# Patient Record
Sex: FEMALE | Race: BLACK OR AFRICAN AMERICAN | Employment: FULL TIME | URBAN - METROPOLITAN AREA
[De-identification: names, ages, dates, MRNs, and addresses within clinical notes are randomized per-mention and may not be internally consistent; named-entity substitution may affect disease eponyms.]

---

## 2017-09-01 ENCOUNTER — OFFICE VISIT (OUTPATIENT)
Dept: FAMILY MEDICINE CLINIC | Age: 52
End: 2017-09-01

## 2017-09-01 VITALS
HEIGHT: 61 IN | TEMPERATURE: 98.9 F | BODY MASS INDEX: 23.79 KG/M2 | OXYGEN SATURATION: 98 % | SYSTOLIC BLOOD PRESSURE: 122 MMHG | RESPIRATION RATE: 18 BRPM | DIASTOLIC BLOOD PRESSURE: 80 MMHG | WEIGHT: 126 LBS | HEART RATE: 80 BPM

## 2017-09-01 DIAGNOSIS — R23.3 EASY BRUISING: ICD-10-CM

## 2017-09-01 DIAGNOSIS — L65.9 ALOPECIA: ICD-10-CM

## 2017-09-01 DIAGNOSIS — R25.2 CRAMP OF BOTH LOWER EXTREMITIES: ICD-10-CM

## 2017-09-01 DIAGNOSIS — Z00.00 ROUTINE GENERAL MEDICAL EXAMINATION AT A HEALTH CARE FACILITY: Primary | ICD-10-CM

## 2017-09-01 NOTE — PROGRESS NOTES
Jesús Corral is a 50 y.o. female here to establish care       1. Have you been to the ER, urgent care clinic or hospitalized since your last visit? NO.     2. Have you seen or consulted any other health care providers outside of the [facility] since your last visit (Include any pap smears or colon screening)? NO      Do you have an Advanced Directive? NO    Would you like information on Advanced Directives?  NO    Learning Assessment 9/1/2017   PRIMARY LEARNER Patient   HIGHEST LEVEL OF EDUCATION - PRIMARY LEARNER  4 YEARS OF COLLEGE   BARRIERS PRIMARY LEARNER NONE   CO-LEARNER CAREGIVER No   PRIMARY LANGUAGE ENGLISH   LEARNER PREFERENCE PRIMARY DEMONSTRATION     VIDEOS   ANSWERED BY patient    RELATIONSHIP SELF

## 2017-09-01 NOTE — PROGRESS NOTES
HISTORY OF PRESENT ILLNESS  Latisha Pickens is a 50 y.o. female. Establish Care   The history is provided by the patient. Pertinent negatives include no chest pain, no headaches and no shortness of breath. History reviewed. No pertinent past medical history. Past Surgical History:   Procedure Laterality Date    HX  SECTION      HX HYSTEROSCOPY WITH ENDOMETRIAL ABLATION         Family History   Problem Relation Age of Onset    Hypertension Mother     Diabetes Mother    [facility] Hearing Impairment Mother     Kidney Disease Mother     Cancer Father      bladder cancer     Diabetes Father     Diabetes Maternal Grandmother     Hypertension Maternal Grandmother     Heart Attack Maternal Grandmother     Cancer Maternal Grandfather      unknown    Heart Disease Neg Hx        History   Smoking Status    Never Smoker   Smokeless Tobacco    Never Used       History   Alcohol Use    Yes     Comment: social drinker        Health Maintenance Review:  Colonoscopy - Due referral done by GYN  Mammogram -  - additional views requested and scheduled  Pap Smear - 2017, normal uterine fibroids on ultrasound    Immunizations:  Tetanus - 3/2017  Influenza - Declines    Establish Care/Wellness Encounter Plan:  Anticipatory guidance and recommendations provided verbally and with printed information. Return for routine follow up in 1 year, sooner with any problems. Review of Systems   Constitutional: Negative for chills, fever and weight loss. HENT: Negative for hearing loss. Eyes: Negative for blurred vision and double vision. \" preglaucoma\" on drops   Respiratory: Negative for cough, shortness of breath and wheezing. Cardiovascular: Negative for chest pain, palpitations and leg swelling. Genitourinary: Negative for dysuria and urgency. Musculoskeletal: Negative for joint pain. Myalgias:     Skin: Negative for itching and rash.    Neurological: Negative for dizziness, tingling, sensory change, focal weakness and headaches. Endo/Heme/Allergies: Positive for environmental allergies (seasonal). Psychiatric/Behavioral: Negative for depression. The patient is not nervous/anxious and does not have insomnia. Visit Vitals    /80 (BP 1 Location: Left arm, BP Patient Position: Sitting)    Pulse 80    Temp 98.9 °F (37.2 °C) (Oral)    Resp 18    Ht 5' 1\" (1.549 m)    Wt 126 lb (57.2 kg)    LMP  (LMP Unknown)    SpO2 98%    BMI 23.81 kg/m2       Physical Exam   Constitutional: She is oriented to person, place, and time. She appears well-developed and well-nourished. HENT:   Right Ear: Tympanic membrane and ear canal normal.   Left Ear: Tympanic membrane and ear canal normal.   Mouth/Throat: Oropharynx is clear and moist.   Eyes: Conjunctivae and EOM are normal. Pupils are equal, round, and reactive to light. Neck: Neck supple. Cardiovascular: Normal rate, regular rhythm and normal heart sounds. Pulmonary/Chest: Effort normal and breath sounds normal.   Abdominal: Soft. Bowel sounds are normal. She exhibits no mass. There is no tenderness. Neurological: She is alert and oriented to person, place, and time. Rhomberg negative   Psychiatric: She has a normal mood and affect. Her behavior is normal.   Nursing note and vitals reviewed. Problems Encounter:  HPI - Reports persistent problems with cramps in her calves during the day and night, cramps are severe and \"make her toes curl\", chronic constipation, had been doing well with Miralax but stopped it out of concern that it might exacerbate leg cramps, reports concern about thinning hair, no large pateches     ROS:  Gastrointestinal: Positive for constipation (Taking Mirilax with good results, concerned about impact on leg cramps ). Negative for abdominal pain, diarrhea, heartburn, nausea and vomiting. Musculoskeletal: Positive for myalgias (cramping in both calfs off and on for  two years, makes toes curl).  Negative for joint pain. Skin: Negative for itching and rash. Hair is starting to thin   Exam:  Head: Normocephalic. Unable to examine scalp/hair follicles because of patient wearing hair extensions. Abdominal: Soft. Bowel sounds are normal. She exhibits no mass. There is no tenderness. Musculoskeletal: No calf asymmetry or tenderness She exhibits no edema. Neurological: She displays abnormal reflex (absent patellar and Achilles). Cardiovascular: Intact distal pulses. Neurological: She displays abnormal reflex (absent patellar and Achilles). Skin: Skin is warm and dry with no ecchymosis noted currently     ASSESSMENT and PLAN    ICD-10-CM ICD-9-CM    1. Routine general medical examination at a health care facility Z00.00 V70.0    2. Alopecia L65.9 704.00    3. Easy bruising R23.8 782.9    4. Cramp of both lower extremities R25.2 729.82    Sign release for medical records  Further disposition regarding lab evaluation of leg cramps, easy bruising and alopecia after recent previous lab results are reviewed to determine what studies have already been done.

## 2017-09-01 NOTE — MR AVS SNAPSHOT
Visit Information Date & Time Provider Department Dept. Phone Encounter #  
 9/1/2017  9:00 AM Kesha Luu, [facility] [phone] [id number] Upcoming Health Maintenance Date Due DTaP/Tdap/Td series (1 - Tdap) 3/6/1988 PAP AKA CERVICAL CYTOLOGY 3/6/1988 BREAST CANCER SCRN MAMMOGRAM 3/6/2017 FOBT Q 1 YEAR AGE 50-75 3/6/2017 Allergies as of 9/1/2017  Review Complete On: 9/1/2017 By: Kesha Luu MD  
  
 Severity Noted Reaction Type Reactions Aspirin  09/01/2017    Other (comments) Pt reports bruising Current Immunizations  Never Reviewed No immunizations on file. Not reviewed this visit You Were Diagnosed With   
  
 Codes Comments Routine general medical examination at a health care facility    -  Primary ICD-10-CM: Z00.00 ICD-9-CM: V70.0 Alopecia     ICD-10-CM: L65.9 ICD-9-CM: 704.00 Easy bruising     ICD-10-CM: R23.8 ICD-9-CM: 782.9 Cramp of both lower extremities     ICD-10-CM: R25.2 ICD-9-CM: 729.82 Vitals BP Pulse Temp Resp Height(growth percentile) Weight(growth percentile) 122/80 (BP 1 Location: Left arm, BP Patient Position: Sitting) 80 98.9 °F (37.2 °C) (Oral) 18 5' 1\" (1.549 m) 126 lb (57.2 kg) LMP SpO2 BMI OB Status Smoking Status (LMP Unknown) 98% 23.81 kg/m2 Having regular periods Never Smoker BMI and BSA Data Body Mass Index Body Surface Area  
 23.81 kg/m 2 1.57 m 2 Your Updated Medication List  
  
Notice  As of 9/1/2017  9:56 AM  
 You have not been prescribed any medications. Patient Instructions Sign release for medical records Anticipatory guidance and recommendations provided verbally and with printed information. Return for routine follow up in 1 year, sooner with any problems. Well Visit, Women 48 to 72: Care Instructions Your Care Instructions Physical exams can help you stay healthy.  Your doctor has checked your overall health and may have suggested ways to take good care of yourself. He or she also may have recommended tests. At home, you can help prevent illness with healthy eating, regular exercise, and other steps. Follow-up care is a key part of your treatment and safety. Be sure to make and go to all appointments, and call your doctor if you are having problems. It's also a good idea to know your test results and keep a list of the medicines you take. How can you care for yourself at home? · Reach and stay at a healthy weight. This will lower your risk for many problems, such as obesity, diabetes, heart disease, and high blood pressure. · Get at least 30 minutes of exercise on most days of the week. Walking is a good choice. You also may want to do other activities, such as running, swimming, cycling, or playing tennis or team sports. · Do not smoke. Smoking can make health problems worse. If you need help quitting, talk to your doctor about stop-smoking programs and medicines. These can increase your chances of quitting for good. · Protect your skin from too much sun. When you're outdoors from 10 a.m. to 4 p.m., stay in the shade or cover up with clothing and a hat with a wide brim. Wear sunglasses that block UV rays. Even when it's cloudy, put broad-spectrum sunscreen (SPF 30 or higher) on any exposed skin. · See a dentist one or two times a year for checkups and to have your teeth cleaned. · Wear a seat belt in the car. · Limit alcohol to 1 drink a day. Too much alcohol can cause health problems. Follow your doctor's advice about when to have certain tests. These tests can spot problems early. · Cholesterol. Your doctor will tell you how often to have this done based on your age, family history, or other things that can increase your risk for heart attack and stroke. · Blood pressure.  Have your blood pressure checked during a routine doctor visit. Your doctor will tell you how often to check your blood pressure based on your age, your blood pressure results, and other factors. · Mammogram. Ask your doctor how often you should have a mammogram, which is an X-ray of your breasts. A mammogram can spot breast cancer before it can be felt and when it is easiest to treat. · Pap test and pelvic exam. Ask your doctor how often you should have a Pap test. You may not need to have a Pap test as often as you used to. · Vision. Have your eyes checked every year or two or as often as your doctor suggests. Some experts recommend that you have yearly exams for glaucoma and other age-related eye problems starting at age 48. · Hearing. Tell your doctor if you notice any change in your hearing. You can have tests to find out how well you hear. · Diabetes. Ask your doctor whether you should have tests for diabetes. · Colon cancer. You should begin tests for colon cancer at age 48. You may have one of several tests. Your doctor will tell you how often to have tests based on your age and risk. Risks include whether you already had a precancerous polyp removed from your colon or whether your parents, sisters and brothers, or children have had colon cancer. · Thyroid disease. Talk to your doctor about whether to have your thyroid checked as part of a regular physical exam. Women have an increased chance of a thyroid problem. · Osteoporosis. You should begin tests for bone density at age 72. If you are younger than 72, ask your doctor whether you have factors that may increase your risk for this disease. You may want to have this test before age 72. · Heart attack and stroke risk. At least every 4 to 6 years, you should have your risk for heart attack and stroke assessed. Your doctor uses factors such as your age, blood pressure, cholesterol, and whether you smoke or have diabetes to show what your risk for a heart attack or stroke is over the next 10 years. When should you call for help? Watch closely for changes in your health, and be sure to contact your doctor if you have any problems or symptoms that concern you. Where can you learn more? Go to http://ryder-mynor.info/. Enter W614 in the search box to learn more about \"Well Visit, Women 50 to 72: Care Instructions. \" Current as of: July 19, 2016 Content Version: 11.3 © 6952-2432 [employer]. Care instructions adapted under license by [employer] (which disclaims liability or warranty for this information). If you have questions about a medical condition or this instruction, always ask your healthcare professional. [place] [address] any warranty or liability for your use of this information. Introducing [facility]! Mirna Coates introduces Penneo patient portal. Now you can access parts of your medical record, email your doctor's office, and request medication refills online. 1. In your internet browser, go to [email or web address] 2. Click on the First Time User? Click Here link in the Sign In box. You will see the New Member Sign Up page. 3. Enter your Penneo Access Code exactly as it appears below. You will not need to use this code after youve completed the sign-up process. If you do not sign up before the expiration date, you must request a new code. · Penneo Access Code: [email or web address] Expires: 11/30/2017  8:51 AM 
 
4. Enter the last four digits of your Social Security Number (xxxx) and Date of Birth (mm/dd/yyyy) as indicated and click Submit. You will be taken to the next sign-up page. 5. Create a Primedict ID. This will be your Penneo login ID and cannot be changed, so think of one that is secure and easy to remember. 6. Create a Penneo password. You can change your password at any time. 7. Enter your Password Reset Question and Answer.  This can be used at a later time if you forget your password. 8. Enter your e-mail address. You will receive e-mail notification when new information is available in [address] 9. Click Sign Up. You can now view and download portions of your medical record. 10. Click the Download Summary menu link to download a portable copy of your medical information. If you have questions, please visit the Frequently Asked Questions section of the GroupZoom website. Remember, GroupZoom is NOT to be used for urgent needs. For medical emergencies, dial 911. Now available from your iPhone and Android! Please provide this summary of care documentation to your next provider. Your primary care clinician is listed as Amado Carmichael. If you have any questions after today's visit, please call [phone].

## 2017-09-10 NOTE — PATIENT INSTRUCTIONS
Sign release for medical records  Anticipatory guidance and recommendations provided verbally and with printed information. Return for routine follow up in 1 year, sooner with any problems. Well Visit, Women 48 to 72: Care Instructions  Your Care Instructions  Physical exams can help you stay healthy. Your doctor has checked your overall health and may have suggested ways to take good care of yourself. He or she also may have recommended tests. At home, you can help prevent illness with healthy eating, regular exercise, and other steps. Follow-up care is a key part of your treatment and safety. Be sure to make and go to all appointments, and call your doctor if you are having problems. It's also a good idea to know your test results and keep a list of the medicines you take. How can you care for yourself at home? · Reach and stay at a healthy weight. This will lower your risk for many problems, such as obesity, diabetes, heart disease, and high blood pressure. · Get at least 30 minutes of exercise on most days of the week. Walking is a good choice. You also may want to do other activities, such as running, swimming, cycling, or playing tennis or team sports. · Do not smoke. Smoking can make health problems worse. If you need help quitting, talk to your doctor about stop-smoking programs and medicines. These can increase your chances of quitting for good. · Protect your skin from too much sun. When you're outdoors from 10 a.m. to 4 p.m., stay in the shade or cover up with clothing and a hat with a wide brim. Wear sunglasses that block UV rays. Even when it's cloudy, put broad-spectrum sunscreen (SPF 30 or higher) on any exposed skin. · See a dentist one or two times a year for checkups and to have your teeth cleaned. · Wear a seat belt in the car. · Limit alcohol to 1 drink a day. Too much alcohol can cause health problems. Follow your doctor's advice about when to have certain tests.  These tests can spot problems early. · Cholesterol. Your doctor will tell you how often to have this done based on your age, family history, or other things that can increase your risk for heart attack and stroke. · Blood pressure. Have your blood pressure checked during a routine doctor visit. Your doctor will tell you how often to check your blood pressure based on your age, your blood pressure results, and other factors. · Mammogram. Ask your doctor how often you should have a mammogram, which is an X-ray of your breasts. A mammogram can spot breast cancer before it can be felt and when it is easiest to treat. · Pap test and pelvic exam. Ask your doctor how often you should have a Pap test. You may not need to have a Pap test as often as you used to. · Vision. Have your eyes checked every year or two or as often as your doctor suggests. Some experts recommend that you have yearly exams for glaucoma and other age-related eye problems starting at age 48. · Hearing. Tell your doctor if you notice any change in your hearing. You can have tests to find out how well you hear. · Diabetes. Ask your doctor whether you should have tests for diabetes. · Colon cancer. You should begin tests for colon cancer at age 48. You may have one of several tests. Your doctor will tell you how often to have tests based on your age and risk. Risks include whether you already had a precancerous polyp removed from your colon or whether your parents, sisters and brothers, or children have had colon cancer. · Thyroid disease. Talk to your doctor about whether to have your thyroid checked as part of a regular physical exam. Women have an increased chance of a thyroid problem. · Osteoporosis. You should begin tests for bone density at age 72. If you are younger than 72, ask your doctor whether you have factors that may increase your risk for this disease. You may want to have this test before age 72. · Heart attack and stroke risk.  At least every 4 to 6 years, you should have your risk for heart attack and stroke assessed. Your doctor uses factors such as your age, blood pressure, cholesterol, and whether you smoke or have diabetes to show what your risk for a heart attack or stroke is over the next 10 years. When should you call for help? Watch closely for changes in your health, and be sure to contact your doctor if you have any problems or symptoms that concern you. Where can you learn more? Go to http://ryder-mynor.info/. Enter I913 in the search box to learn more about \"Well Visit, Women 50 to 72: Care Instructions. \"  Current as of: July 19, 2016  Content Version: 11.3  © 4578-1756 [employer], [employer]. Care instructions adapted under license by [employer] (which disclaims liability or warranty for this information). If you have questions about a medical condition or this instruction, always ask your healthcare professional. [address] any warranty or liability for your use of this information. day(s)/3

## 2017-11-28 ENCOUNTER — TELEPHONE (OUTPATIENT)
Dept: FAMILY MEDICINE CLINIC | Age: 52
End: 2017-11-28

## 2017-11-28 PROBLEM — E55.9 VITAMIN D INSUFFICIENCY: Status: ACTIVE | Noted: 2017-11-28

## 2017-11-28 NOTE — TELEPHONE ENCOUNTER
Please advise that records from previous provider have been received. Lab results from 3/2017 including lipid panel, CBC, urinalysis and comprehensive metabolic panel were normal. Vitamin D level was slightly low. Please advise her to take OTC Vitamin D3, 2000 units daily. Will need follow up and lab in 3/2018.

## 2018-02-12 ENCOUNTER — TELEPHONE (OUTPATIENT)
Dept: FAMILY MEDICINE CLINIC | Age: 53
End: 2018-02-12

## 2018-02-12 DIAGNOSIS — Z13.228 SCREENING FOR METABOLIC DISORDER: ICD-10-CM

## 2018-02-12 DIAGNOSIS — Z13.29 SCREENING FOR THYROID DISORDER: ICD-10-CM

## 2018-02-12 DIAGNOSIS — Z13.0 SCREENING, ANEMIA, DEFICIENCY, IRON: Primary | ICD-10-CM

## 2018-02-12 DIAGNOSIS — Z13.21 ENCOUNTER FOR VITAMIN DEFICIENCY SCREENING: ICD-10-CM

## 2018-02-12 DIAGNOSIS — Z13.89 SCREENING FOR BLOOD OR PROTEIN IN URINE: ICD-10-CM

## 2018-02-12 DIAGNOSIS — Z13.220 SCREENING, LIPID: ICD-10-CM

## 2018-02-12 DIAGNOSIS — Z13.1 SCREENING FOR DIABETES MELLITUS: ICD-10-CM

## 2018-02-12 NOTE — TELEPHONE ENCOUNTER
Pt scheduled an appt for her yearly CPE on 9/4 and would like to have routine fasting labs drawn prior to her appt. Please review and order accordingly.

## 2018-03-20 ENCOUNTER — HOSPITAL ENCOUNTER (OUTPATIENT)
Dept: LAB | Age: 53
Discharge: HOME | End: 2018-03-20
Payer: COMMERCIAL

## 2018-03-20 ENCOUNTER — OFFICE VISIT (OUTPATIENT)
Dept: FAMILY MEDICINE CLINIC | Age: 53
End: 2018-03-20

## 2018-03-20 VITALS
RESPIRATION RATE: 18 BRPM | OXYGEN SATURATION: 98 % | HEIGHT: 61 IN | SYSTOLIC BLOOD PRESSURE: 110 MMHG | TEMPERATURE: 98.9 F | HEART RATE: 72 BPM | BODY MASS INDEX: 25.26 KG/M2 | DIASTOLIC BLOOD PRESSURE: 86 MMHG | WEIGHT: 133.8 LBS

## 2018-03-20 DIAGNOSIS — Z13.228 SCREENING FOR METABOLIC DISORDER: ICD-10-CM

## 2018-03-20 DIAGNOSIS — K21.9 GASTROESOPHAGEAL REFLUX DISEASE, ESOPHAGITIS PRESENCE NOT SPECIFIED: ICD-10-CM

## 2018-03-20 DIAGNOSIS — Z13.89 SCREENING FOR BLOOD OR PROTEIN IN URINE: ICD-10-CM

## 2018-03-20 DIAGNOSIS — Z13.29 SCREENING FOR THYROID DISORDER: ICD-10-CM

## 2018-03-20 DIAGNOSIS — Z13.220 SCREENING, LIPID: ICD-10-CM

## 2018-03-20 DIAGNOSIS — Z13.1 SCREENING FOR DIABETES MELLITUS: ICD-10-CM

## 2018-03-20 DIAGNOSIS — M62.838 TRAPEZIUS MUSCLE SPASM: Primary | ICD-10-CM

## 2018-03-20 DIAGNOSIS — Z13.0 SCREENING, ANEMIA, DEFICIENCY, IRON: ICD-10-CM

## 2018-03-20 DIAGNOSIS — Z13.21 ENCOUNTER FOR VITAMIN DEFICIENCY SCREENING: ICD-10-CM

## 2018-03-20 LAB
25(OH)D3 SERPL-MCNC: 31.6 NG/ML (ref 30–100)
ALBUMIN SERPL-MCNC: 4.1 G/DL (ref 3.4–5)
ALBUMIN/GLOB SERPL: 1.2 {RATIO} (ref 0.8–1.7)
ALP SERPL-CCNC: 55 U/L (ref 45–117)
ALT SERPL-CCNC: 51 U/L (ref 13–56)
ANION GAP SERPL CALC-SCNC: 8 MMOL/L (ref 3–18)
APPEARANCE UR: CLEAR
AST SERPL-CCNC: 29 U/L (ref 15–37)
BASOPHILS # BLD: 0 K/UL (ref 0–0.06)
BASOPHILS NFR BLD: 1 % (ref 0–2)
BILIRUB SERPL-MCNC: 0.3 MG/DL (ref 0.2–1)
BILIRUB UR QL: NEGATIVE
BUN SERPL-MCNC: 13 MG/DL (ref 7–18)
BUN/CREAT SERPL: 16 (ref 12–20)
CALCIUM SERPL-MCNC: 8.5 MG/DL (ref 8.5–10.1)
CHLORIDE SERPL-SCNC: 105 MMOL/L (ref 100–108)
CHOLEST SERPL-MCNC: 140 MG/DL
CO2 SERPL-SCNC: 26 MMOL/L (ref 21–32)
COLOR UR: YELLOW
CREAT SERPL-MCNC: 0.8 MG/DL (ref 0.6–1.3)
DIFFERENTIAL METHOD BLD: ABNORMAL
EOSINOPHIL # BLD: 0.1 K/UL (ref 0–0.4)
EOSINOPHIL NFR BLD: 2 % (ref 0–5)
ERYTHROCYTE [DISTWIDTH] IN BLOOD BY AUTOMATED COUNT: 12.8 % (ref 11.6–14.5)
EST. AVERAGE GLUCOSE BLD GHB EST-MCNC: 126 MG/DL
GLOBULIN SER CALC-MCNC: 3.4 G/DL (ref 2–4)
GLUCOSE SERPL-MCNC: 83 MG/DL (ref 74–99)
GLUCOSE UR STRIP.AUTO-MCNC: NEGATIVE MG/DL
HBA1C MFR BLD: 6 % (ref 4.2–5.6)
HCT VFR BLD AUTO: 38.8 % (ref 35–45)
HDLC SERPL-MCNC: 58 MG/DL (ref 40–60)
HDLC SERPL: 2.4 {RATIO} (ref 0–5)
HGB BLD-MCNC: 12.6 G/DL (ref 12–16)
HGB UR QL STRIP: NEGATIVE
KETONES UR QL STRIP.AUTO: NEGATIVE MG/DL
LDLC SERPL CALC-MCNC: 70.8 MG/DL (ref 0–100)
LEUKOCYTE ESTERASE UR QL STRIP.AUTO: NEGATIVE
LIPID PROFILE,FLP: NORMAL
LYMPHOCYTES # BLD: 1.8 K/UL (ref 0.9–3.6)
LYMPHOCYTES NFR BLD: 45 % (ref 21–52)
MCH RBC QN AUTO: 29.3 PG (ref 24–34)
MCHC RBC AUTO-ENTMCNC: 32.5 G/DL (ref 31–37)
MCV RBC AUTO: 90.2 FL (ref 74–97)
MONOCYTES # BLD: 0.4 K/UL (ref 0.05–1.2)
MONOCYTES NFR BLD: 10 % (ref 3–10)
NEUTS SEG # BLD: 1.7 K/UL (ref 1.8–8)
NEUTS SEG NFR BLD: 42 % (ref 40–73)
NITRITE UR QL STRIP.AUTO: NEGATIVE
PH UR STRIP: 6 [PH] (ref 5–8)
PLATELET # BLD AUTO: 188 K/UL (ref 135–420)
PMV BLD AUTO: 10.8 FL (ref 9.2–11.8)
POTASSIUM SERPL-SCNC: 4.1 MMOL/L (ref 3.5–5.5)
PROT SERPL-MCNC: 7.5 G/DL (ref 6.4–8.2)
PROT UR STRIP-MCNC: NEGATIVE MG/DL
RBC # BLD AUTO: 4.3 M/UL (ref 4.2–5.3)
SODIUM SERPL-SCNC: 139 MMOL/L (ref 136–145)
SP GR UR REFRACTOMETRY: 1.02 (ref 1–1.03)
TRIGL SERPL-MCNC: 56 MG/DL (ref ?–150)
TSH SERPL DL<=0.05 MIU/L-ACNC: 1.87 UIU/ML (ref 0.36–3.74)
UROBILINOGEN UR QL STRIP.AUTO: 0.2 EU/DL (ref 0.2–1)
VLDLC SERPL CALC-MCNC: 11.2 MG/DL
WBC # BLD AUTO: 4 K/UL (ref 4.6–13.2)

## 2018-03-20 PROCEDURE — 81003 URINALYSIS AUTO W/O SCOPE: CPT | Performed by: FAMILY MEDICINE

## 2018-03-20 PROCEDURE — 80061 LIPID PANEL: CPT | Performed by: FAMILY MEDICINE

## 2018-03-20 PROCEDURE — 36415 COLL VENOUS BLD VENIPUNCTURE: CPT | Performed by: FAMILY MEDICINE

## 2018-03-20 PROCEDURE — 83036 HEMOGLOBIN GLYCOSYLATED A1C: CPT | Performed by: FAMILY MEDICINE

## 2018-03-20 PROCEDURE — 82306 VITAMIN D 25 HYDROXY: CPT | Performed by: FAMILY MEDICINE

## 2018-03-20 PROCEDURE — 80053 COMPREHEN METABOLIC PANEL: CPT | Performed by: FAMILY MEDICINE

## 2018-03-20 PROCEDURE — 85025 COMPLETE CBC W/AUTO DIFF WBC: CPT | Performed by: FAMILY MEDICINE

## 2018-03-20 PROCEDURE — 84443 ASSAY THYROID STIM HORMONE: CPT | Performed by: FAMILY MEDICINE

## 2018-03-20 RX ORDER — RANITIDINE 150 MG/1
150 TABLET, FILM COATED ORAL 2 TIMES DAILY
Qty: 180 TAB | Refills: 1 | Status: SHIPPED | OUTPATIENT
Start: 2018-03-20 | End: 2018-08-16

## 2018-03-20 NOTE — MR AVS SNAPSHOT
[facility] 
 
 
 [address] [facility] [address] 
[phone] Patient: Du Weiss MRN: [id number] :1965 Visit Information Date & Time Provider Department Dept. Phone Encounter #  
 3/20/2018  7:45 AM Ade Quiroga MD [employer] [phone] [id number] Your Appointments 2018  7:30 AM  
PHYSICAL with Ade Quiroga MD  
[facility]: CPE  
 [address] [facility] [id number] [phone]  
  
   
 [address] Upcoming Health Maintenance Date Due DTaP/Tdap/Td series (1 - Tdap) 3/6/1988 PAP AKA CERVICAL CYTOLOGY 3/6/1988 BREAST CANCER SCRN MAMMOGRAM 3/6/2017 FOBT Q 1 YEAR AGE 50-75 3/6/2017 Allergies as of 3/20/2018  Review Complete On: 3/20/2018 By: Ade Quiroga MD  
  
 Severity Noted Reaction Type Reactions Aspirin  2017    Other (comments) Pt reports bruising Current Immunizations  Never Reviewed No immunizations on file. Not reviewed this visit You Were Diagnosed With   
  
 Codes Comments Trapezius muscle spasm    -  Primary ICD-10-CM: T95.498 ICD-9-CM: 728.85 Gastroesophageal reflux disease, esophagitis presence not specified     ICD-10-CM: K21.9 ICD-9-CM: 530.81 Vitals BP Pulse Temp Resp Height(growth percentile) Weight(growth percentile) 110/86 (BP 1 Location: Left arm, BP Patient Position: Sitting) 72 98.9 °F (37.2 °C) (Oral) 18 5' 1\" (1.549 m) 133 lb 12.8 oz (60.7 kg) SpO2 BMI OB Status Smoking Status 98% 25.28 kg/m2 Having regular periods Never Smoker Vitals History BMI and BSA Data Body Mass Index Body Surface Area  
 25.28 kg/m 2 1.62 m 2 Your Updated Medication List  
  
Notice  As of 3/20/2018  8:02 AM  
 You have not been prescribed any medications. We Performed the Following REFERRAL TO PHYSICAL THERAPY [GLO36 Custom] Comments:  
 Please evaluate patient for left parascapular pain and tenderness consistent with trigger point, please trat as indicated including possible dry needling. Referral Information Referral ID Referred By Referred To  
  
 [id number] Pati Shah [address], [address] [place] [address] Phone: [phone] Fax: [phone] Visits Status Start Date End Date 1 New Request 3/20/18 3/20/19 If your referral has a status of pending review or denied, additional information will be sent to support the outcome of this decision. Patient Instructions Apply warm wet compresses frequently, avoid painful activity, take OTC analgesics such as ibuprofen or acetaminophen as needed. Follow exercise instructions Fasting lab, further disposition pending lab results if indicated. Avoid citrus, dairy, caffeine, tomato, alcohol and NSAIDs. Do not eat within 2-3 hours 
of bedtime. Follow up for new symptoms, worsening symptoms or failure to improve. Healthy Upper Back: Exercises Your Care Instructions Here are some examples of exercises for your upper back. Start each exercise slowly. Ease off the exercise if you start to have pain. Your doctor or physical therapist will tell you when you can start these exercises and which ones will work best for you. How to do the exercises Lower neck and upper back stretch 1. Stretch your arms out in front of your body. Clasp one hand on top of your other hand. 2. Gently reach out so that you feel your shoulder blades stretching away from each other. 3. Gently bend your head forward. 4. Hold for 15 to 30 seconds. 5. Repeat 2 to 4 times. Midback stretch If you have knee pain, do not do this exercise. 1. Kneel on the floor, and sit back on your ankles. 2. Lean forward, place your hands on the floor, and stretch your arms out in front of you. Rest your head between your arms. 3. Gently push your chest toward the floor, reaching as far in front of you as possible. 4. Hold for 15 to 30 seconds. 5. Repeat 2 to 4 times. Shoulder rolls 1. Sit comfortably with your feet shoulder-width apart. You can also do this exercise while standing. 2. Roll your shoulders up, then back, and then down in a smooth, circular motion. 3. Repeat 2 to 4 times. Wall push-up 1. Stand against a wall with your feet about 12 to 24 inches back from the wall. If you feel any pain when you do this exercise, stand closer to the wall. 2. Place your hands on the wall slightly wider apart than your shoulders, and lean forward. 3. Gently lean your body toward the wall. Then push back to your starting position. Keep the motion smooth and controlled. 4. Repeat 8 to 12 times. Resisted shoulder blade squeeze For this exercise, you will need elastic exercise material, such as surgical tubing or Thera-Band. 1. Sit or stand, holding the band in both hands in front of you. Keep your elbows close to your sides, bent at a 90-degree angle. Your palms should face up. 2. Squeeze your shoulder blades together, and move your arms to the outside, stretching the band. Be sure to keep your elbows at your sides while you do this. 3. Relax. 4. Repeat 8 to 12 times. Resisted rows For this exercise, you will need elastic exercise material, such as surgical tubing or Thera-Band. 1. Put the band around a solid object, such as a bedpost, at about waist level. Hold one end of the band in each hand. 2. With your elbows at your sides and bent to 90 degrees, pull the band back to move your shoulder blades toward each other. Return to the starting position. 3. Repeat 8 to 12 times. Follow-up care is a key part of your treatment and safety.  Be sure to make and go to all appointments, and call your doctor if you are having problems. It's also a good idea to know your test results and keep a list of the medicines you take. Where can you learn more? Go to http://ryder-mynor.info/. Enter Y510 in the search box to learn more about \"Healthy Upper Back: Exercises. \" Current as of: March 21, 2017 Content Version: 11.4 © 8087-0260 [employer]. Care instructions adapted under license by [employer] (which disclaims liability or warranty for this information). If you have questions about a medical condition or this instruction, always ask your healthcare professional. [address] any warranty or liability for your use of this information. Introducing [facility]! Batsheva Arnold introduces ZAOZAO patient portal. Now you can access parts of your medical record, email your doctor's office, and request medication refills online. 1. In your internet browser, go to [email or web address] 2. Click on the First Time User? Click Here link in the Sign In box. You will see the New Member Sign Up page. 3. Enter your ZAOZAO Access Code exactly as it appears below. You will not need to use this code after youve completed the sign-up process. If you do not sign up before the expiration date, you must request a new code. · ZAOZAO Access Code: [email or web address] Expires: 6/18/2018  8:02 AM 
 
4. Enter the last four digits of your Social Security Number (xxxx) and Date of Birth (mm/dd/yyyy) as indicated and click Submit. You will be taken to the next sign-up page. 5. Create a Party Over Heret ID. This will be your ZAOZAO login ID and cannot be changed, so think of one that is secure and easy to remember. 6. Create a ZAOZAO password. You can change your password at any time. 7. Enter your Password Reset Question and Answer. This can be used at a later time if you forget your password. 8. Enter your e-mail address. You will receive e-mail notification when new information is available in [address] 9. Click Sign Up. You can now view and download portions of your medical record. 10. Click the Download Summary menu link to download a portable copy of your medical information. If you have questions, please visit the Frequently Asked Questions section of the Tymphany website. Remember, Tymphany is NOT to be used for urgent needs. For medical emergencies, dial 911. Now available from your iPhone and Android! Please provide this summary of care documentation to your next provider. Your primary care clinician is listed as Michael Cote. If you have any questions after today's visit, please call [phone].

## 2018-03-20 NOTE — PATIENT INSTRUCTIONS
Apply warm wet compresses frequently, avoid painful activity, take OTC analgesics such as ibuprofen or acetaminophen as needed. Follow exercise instructions  Fasting lab, further disposition pending lab results if indicated. Avoid citrus, dairy, caffeine, tomato, alcohol and NSAIDs. Do not eat within 2-3 hours  of bedtime. Follow up for new symptoms, worsening symptoms or failure to improve. Healthy Upper Back: Exercises  Your Care Instructions  Here are some examples of exercises for your upper back. Start each exercise slowly. Ease off the exercise if you start to have pain. Your doctor or physical therapist will tell you when you can start these exercises and which ones will work best for you. How to do the exercises  Lower neck and upper back stretch    1. Stretch your arms out in front of your body. Clasp one hand on top of your other hand. 2. Gently reach out so that you feel your shoulder blades stretching away from each other. 3. Gently bend your head forward. 4. Hold for 15 to 30 seconds. 5. Repeat 2 to 4 times. Midback stretch    If you have knee pain, do not do this exercise. 1. Kneel on the floor, and sit back on your ankles. 2. Lean forward, place your hands on the floor, and stretch your arms out in front of you. Rest your head between your arms. 3. Gently push your chest toward the floor, reaching as far in front of you as possible. 4. Hold for 15 to 30 seconds. 5. Repeat 2 to 4 times. Shoulder rolls    1. Sit comfortably with your feet shoulder-width apart. You can also do this exercise while standing. 2. Roll your shoulders up, then back, and then down in a smooth, circular motion. 3. Repeat 2 to 4 times. Wall push-up    1. Stand against a wall with your feet about 12 to 24 inches back from the wall. If you feel any pain when you do this exercise, stand closer to the wall. 2. Place your hands on the wall slightly wider apart than your shoulders, and lean forward.   3. Gently lean your body toward the wall. Then push back to your starting position. Keep the motion smooth and controlled. 4. Repeat 8 to 12 times. Resisted shoulder blade squeeze    For this exercise, you will need elastic exercise material, such as surgical tubing or Thera-Band. 1. Sit or stand, holding the band in both hands in front of you. Keep your elbows close to your sides, bent at a 90-degree angle. Your palms should face up. 2. Squeeze your shoulder blades together, and move your arms to the outside, stretching the band. Be sure to keep your elbows at your sides while you do this. 3. Relax. 4. Repeat 8 to 12 times. Resisted rows    For this exercise, you will need elastic exercise material, such as surgical tubing or Thera-Band. 1. Put the band around a solid object, such as a bedpost, at about waist level. Hold one end of the band in each hand. 2. With your elbows at your sides and bent to 90 degrees, pull the band back to move your shoulder blades toward each other. Return to the starting position. 3. Repeat 8 to 12 times. Follow-up care is a key part of your treatment and safety. Be sure to make and go to all appointments, and call your doctor if you are having problems. It's also a good idea to know your test results and keep a list of the medicines you take. Where can you learn more? Go to http://ryder-mynor.info/. Enter R362 in the search box to learn more about \"Healthy Upper Back: Exercises. \"  Current as of: March 21, 2017  Content Version: 11.4  © 8440-3698 [employer]. Care instructions adapted under license by [employer] (which disclaims liability or warranty for this information). If you have questions about a medical condition or this instruction, always ask your healthcare professional. [address] any warranty or liability for your use of this information.

## 2018-03-20 NOTE — PROGRESS NOTES
HISTORY OF PRESENT ILLNESS  Nishant Baez is a 48 y.o. female. Neck Pain   The history is provided by the patient and medical records. This is a new problem. Episode onset: a few months ago. Associated symptoms include shortness of breath. Pertinent negatives include no chest pain and no abdominal pain. Patient Active Problem List   Diagnosis Code    Vitamin D insufficiency E55.9     No current outpatient prescriptions on file. Allergies   Allergen Reactions    Aspirin Other (comments)     Pt reports bruising         Review of Systems   Constitutional: Negative for chills and fever. Respiratory: Positive for shortness of breath. Cardiovascular: Negative for chest pain and palpitations. Gastrointestinal: Positive for constipation, heartburn and nausea. Negative for abdominal pain and diarrhea. Musculoskeletal: Positive for back pain (upper back stiffness, L>R. hears popping  - may have started after shoveling snow) and neck pain. Neurological: Negative for tingling, sensory change and focal weakness. Visit Vitals    /86 (BP 1 Location: Left arm, BP Patient Position: Sitting)    Pulse 72    Temp 98.9 °F (37.2 °C) (Oral)    Resp 18    Ht 5' 1\" (1.549 m)    Wt 133 lb 12.8 oz (60.7 kg)    SpO2 98%    BMI 25.28 kg/m2     Physical Exam   Constitutional: She is oriented to person, place, and time. She appears well-developed and well-nourished. HENT:   Head: Normocephalic. Eyes: Conjunctivae and EOM are normal.   Neck: Normal range of motion. Neck supple. Cardiovascular: Normal rate, regular rhythm and normal heart sounds. Pulmonary/Chest: Effort normal and breath sounds normal.   Abdominal: Soft. There is no tenderness. Musculoskeletal: She exhibits tenderness (left parascapular). She exhibits no edema. Neurological: She is alert and oriented to person, place, and time. Skin: Skin is warm and dry. Psychiatric: She has a normal mood and affect.  Her behavior is normal.   Nursing note and vitals reviewed. ASSESSMENT and PLAN    ICD-10-CM ICD-9-CM    1. Trapezius muscle spasm M62.838 728.85 REFERRAL TO PHYSICAL THERAPY   2. Gastroesophageal reflux disease, esophagitis presence not specified K21.9 530.81 raNITIdine (ZANTAC) 150 mg tablet   Apply warm wet compresses frequently, avoid painful activity, take OTC analgesics such as ibuprofen or acetaminophen as needed. Follow exercise instructions  Fasting lab, further disposition pending lab results if indicated. Take ranitidine (Zantac) 150 mg twice a day before breakfast and at bedtime. Avoid citrus, dairy, caffeine, tomato, alcohol and NSAIDs. Do not eat within 2-3 hours  of bedtime. Follow up for new symptoms, worsening symptoms or failure to improve. PE scheduled 9/4/2018.

## 2018-03-21 PROBLEM — R73.03 PREDIABETES: Status: ACTIVE | Noted: 2018-03-21

## 2018-03-21 NOTE — PROGRESS NOTES
Please advise that lab results were essentially normal except for a mildly elevated HgbA1c at 6.0%. The hemoglobin A1c measures the average glucose level over over the past 3 months. Levels from 4.8-5.6% are considered normal, 5.7-6.4%, prediabetes and 6.5% and above is consistent with type 2 diabetes. Please ask her to avoid dietary starch and sugar, follow a program of regular aerobic exercise. We will recheck this with an in office HgbA1c at her appointment in September.

## 2018-03-26 ENCOUNTER — HOSPITAL ENCOUNTER (OUTPATIENT)
Dept: PHYSICAL THERAPY | Age: 53
End: 2018-03-26

## 2018-08-10 ENCOUNTER — TELEPHONE (OUTPATIENT)
Dept: FAMILY MEDICINE CLINIC | Age: 53
End: 2018-08-10

## 2018-08-10 NOTE — TELEPHONE ENCOUNTER
Spoke with patient and appt scheduled.     Future Appointments  Date Time Provider Douglas Crouch   8/16/2018 7:30 AM MD Carroll Riggins   [facility]2018 [facility]

## 2018-08-10 NOTE — TELEPHONE ENCOUNTER
Pt called during lunch wanting to be advised on whether or not she should be seen. Pt states that she has a tender spot on her stomach on the right side right below her breast and when she presses the spot she can feel a small knot there. She states she noticed it a couple days ago and that also she has been feeling nauseous after she eats as well. I informed the pt that since I was not clinical I could not advise if she should get that checked out or not but I could schedule an appt if she was feeling like she needed to be seen. Pt is requesting a call back from a nurse to discuss issue.

## 2018-08-16 ENCOUNTER — HOSPITAL ENCOUNTER (OUTPATIENT)
Dept: LAB | Age: 53
Discharge: HOME | End: 2018-08-16
Payer: COMMERCIAL

## 2018-08-16 ENCOUNTER — OFFICE VISIT (OUTPATIENT)
Dept: FAMILY MEDICINE CLINIC | Age: 53
End: 2018-08-16

## 2018-08-16 VITALS
RESPIRATION RATE: 16 BRPM | WEIGHT: 127.6 LBS | DIASTOLIC BLOOD PRESSURE: 82 MMHG | TEMPERATURE: 98.6 F | HEIGHT: 61 IN | OXYGEN SATURATION: 98 % | HEART RATE: 68 BPM | BODY MASS INDEX: 24.09 KG/M2 | SYSTOLIC BLOOD PRESSURE: 130 MMHG

## 2018-08-16 DIAGNOSIS — R73.03 PREDIABETES: ICD-10-CM

## 2018-08-16 DIAGNOSIS — R10.11 RUQ ABDOMINAL PAIN: Primary | ICD-10-CM

## 2018-08-16 DIAGNOSIS — R10.11 RUQ ABDOMINAL PAIN: ICD-10-CM

## 2018-08-16 DIAGNOSIS — K80.20 CALCULUS OF GALLBLADDER WITHOUT CHOLECYSTITIS WITHOUT OBSTRUCTION: ICD-10-CM

## 2018-08-16 DIAGNOSIS — E55.9 VITAMIN D INSUFFICIENCY: ICD-10-CM

## 2018-08-16 LAB
ALBUMIN SERPL-MCNC: 4 G/DL (ref 3.4–5)
ALBUMIN/GLOB SERPL: 1.1 {RATIO} (ref 0.8–1.7)
ALP SERPL-CCNC: 56 U/L (ref 45–117)
ALT SERPL-CCNC: 45 U/L (ref 13–56)
ANION GAP SERPL CALC-SCNC: 7 MMOL/L (ref 3–18)
AST SERPL-CCNC: 28 U/L (ref 15–37)
BILIRUB DIRECT SERPL-MCNC: 0.1 MG/DL (ref 0–0.2)
BILIRUB SERPL-MCNC: 0.4 MG/DL (ref 0.2–1)
BUN SERPL-MCNC: 9 MG/DL (ref 7–18)
BUN/CREAT SERPL: 12 (ref 12–20)
CALCIUM SERPL-MCNC: 9.1 MG/DL (ref 8.5–10.1)
CHLORIDE SERPL-SCNC: 108 MMOL/L (ref 100–108)
CO2 SERPL-SCNC: 26 MMOL/L (ref 21–32)
CREAT SERPL-MCNC: 0.76 MG/DL (ref 0.6–1.3)
GLOBULIN SER CALC-MCNC: 3.6 G/DL (ref 2–4)
GLUCOSE SERPL-MCNC: 85 MG/DL (ref 74–99)
HBA1C MFR BLD: 5.5 % (ref 4.2–5.6)
POTASSIUM SERPL-SCNC: 4.3 MMOL/L (ref 3.5–5.5)
PROT SERPL-MCNC: 7.6 G/DL (ref 6.4–8.2)
SODIUM SERPL-SCNC: 141 MMOL/L (ref 136–145)

## 2018-08-16 PROCEDURE — 36415 COLL VENOUS BLD VENIPUNCTURE: CPT | Performed by: FAMILY MEDICINE

## 2018-08-16 PROCEDURE — 83036 HEMOGLOBIN GLYCOSYLATED A1C: CPT | Performed by: FAMILY MEDICINE

## 2018-08-16 PROCEDURE — 80076 HEPATIC FUNCTION PANEL: CPT | Performed by: FAMILY MEDICINE

## 2018-08-16 PROCEDURE — 80048 BASIC METABOLIC PNL TOTAL CA: CPT | Performed by: FAMILY MEDICINE

## 2018-08-16 RX ORDER — DIAPER,BRIEF,INFANT-TODD,DISP
EACH MISCELLANEOUS
COMMUNITY
End: 2021-02-16

## 2018-08-16 RX ORDER — BISMUTH SUBSALICYLATE 262 MG
1 TABLET,CHEWABLE ORAL DAILY
COMMUNITY

## 2018-08-16 NOTE — PATIENT INSTRUCTIONS
Further disposition pending lab and ultrasound results results if indicated. Follow up for new symptoms, worsening symptoms or failure to improve.   Schedule annual PE in 3/2019

## 2018-08-16 NOTE — MR AVS SNAPSHOT
Kaiden Ibrahim 
 
 
 [address] [facility] [address] [phone] Patient: Mohan Iraheta MRN: [id number] :1965 Visit Information Date & Time Provider Department Dept. Phone Encounter #  
 2018  7:30 AM Celia Harrington, 3 [facility] [phone] Your Appointments 2018  7:30 AM  
PHYSICAL with Celia Harrington MD  
[address] [place] [id number] [facility]) Appt Note: CPE  
 [address] [facility] [id number] [phone]  
  
   
 [address] Upcoming Health Maintenance Date Due DTaP/Tdap/Td series (1 - Tdap) 3/6/1988 PAP AKA CERVICAL CYTOLOGY 3/6/1988 BREAST CANCER SCRN MAMMOGRAM 3/6/2017 FOBT Q 1 YEAR AGE 50-75 3/6/2017 Influenza Age 5 to Adult 2018* *Topic was postponed. The date shown is not the original due date. Allergies as of 2018  Review Complete On: 2018 By: Celia Harrington MD  
  
 Severity Noted Reaction Type Reactions Aspirin  2017    Other (comments) Pt reports bruising Current Immunizations  Never Reviewed No immunizations on file. Not reviewed this visit You Were Diagnosed With   
  
 Codes Comments RUQ abdominal pain    -  Primary ICD-10-CM: R10.11 ICD-9-CM: 789.01   
 Prediabetes     ICD-10-CM: R73.03 
ICD-9-CM: 790.29 Vitamin D insufficiency     ICD-10-CM: E55.9 ICD-9-CM: 268.9 Vitals BP Pulse Temp Resp Height(growth percentile) Weight(growth percentile) 130/82 (BP 1 Location: Left arm, BP Patient Position: Sitting) 68 98.6 °F (37 °C) (Oral) 16 5' 1\" (1.549 m) 127 lb 9.6 oz (57.9 kg) SpO2 BMI OB Status Smoking Status 98% 24.11 kg/m2 Having regular periods Never Smoker Vitals History BMI and BSA Data  Body Mass Index Body Surface Area  
 24.11 kg/m 2 1.58 m 2  
  
  
 Preferred Pharmacy Pharmacy Name Phone Jesse Isaacs [facility], [address] [place] [address] [phone] Your Updated Medication List  
  
   
This list is accurate as of 8/16/18  7:49 AM.  Always use your most recent med list.  
  
  
  
  
 biotin 10,000 mcg Cap Take  by mouth.  
  
 multivitamin tablet Commonly known as:  ONE A DAY Take 1 Tab by mouth daily. To-Do List   
 08/16/2018 Lab:  HEMOGLOBIN A1C W/O EAG   
  
 08/16/2018 Lab:  HEPATIC FUNCTION PANEL   
  
 08/16/2018 Lab:  METABOLIC PANEL, BASIC   
  
 08/16/2018 Imaging:  US ABD LTD Patient Instructions Further disposition pending lab and ultrasound results results if indicated. Follow up for new symptoms, worsening symptoms or failure to improve. Schedule annual PE in 3/2019 Introducing [facility]! [facility] introduces BuildersCloud patient portal. Now you can access parts of your medical record, email your doctor's office, and request medication refills online. 1. In your internet browser, go to [email or web address] 2. Click on the First Time User? Click Here link in the Sign In box. You will see the New Member Sign Up page. 3. Enter your BuildersCloud Access Code exactly as it appears below. You will not need to use this code after youve completed the sign-up process. If you do not sign up before the expiration date, you must request a new code. · BuildersCloud Access Code: [email or web address] Expires: 11/14/2018  7:49 AM 
 
4. Enter the last four digits of your Social Security Number (xxxx) and Date of Birth (mm/dd/yyyy) as indicated and click Submit. You will be taken to the next sign-up page. 5. Create a BuildersCloud ID. This will be your BuildersCloud login ID and cannot be changed, so think of one that is secure and easy to remember. 6. Create a BuildersCloud password. You can change your password at any time. 7. Enter your Password Reset Question and Answer. This can be used at a later time if you forget your password. 8. Enter your e-mail address. You will receive e-mail notification when new information is available in [address] 9. Click Sign Up. You can now view and download portions of your medical record. 10. Click the Download Summary menu link to download a portable copy of your medical information. If you have questions, please visit the Frequently Asked Questions section of the vzaar website. Remember, vzaar is NOT to be used for urgent needs. For medical emergencies, dial 911. Now available from your iPhone and Android! Please provide this summary of care documentation to your next provider. Your primary care clinician is listed as Franco Napoles. If you have any questions after today's visit, please call [phone].

## 2018-08-16 NOTE — PROGRESS NOTES
HISTORY OF PRESENT ILLNESS  Charlie Sotelo is a 48 y.o. female. HPI Comments: Ms. Naomie Gordon noted a small tender lump in the right upper abdomen about 2 weeks ago. This no longer tender. She has some post prandial early satiety and nausea and also reports feeling bloated which she attributes to constipation. Other   The history is provided by the patient and medical records. This is a new problem. Episode onset: about a week ago. Pertinent negatives include no abdominal pain. Patient Active Problem List   Diagnosis Code    Vitamin D insufficiency E55.9    Trapezius muscle spasm M62.838    Gastroesophageal reflux disease K21.9    Prediabetes R73.03       Current Outpatient Prescriptions:     multivitamin (ONE A DAY) tablet, Take 1 Tab by mouth daily. , Disp: , Rfl:     biotin 10,000 mcg cap, Take  by mouth., Disp: , Rfl:     Allergies   Allergen Reactions    Aspirin Other (comments)     Pt reports bruising         Review of Systems   Constitutional: Negative for fever and weight loss. Gastrointestinal: Positive for constipation (sometimes, feels bloated), heartburn ( ?, early satiety) and nausea (sometimes after eating). Negative for abdominal pain, blood in stool, diarrhea, melena and vomiting. Visit Vitals    /82 (BP 1 Location: Left arm, BP Patient Position: Sitting)    Pulse 68    Temp 98.6 °F (37 °C) (Oral)    Resp 16    Ht 5' 1\" (1.549 m)    Wt 127 lb 9.6 oz (57.9 kg)    SpO2 98%    BMI 24.11 kg/m2       Physical Exam   Constitutional: She is oriented to person, place, and time. She appears well-developed and well-nourished. HENT:   Head: Normocephalic. Eyes: Conjunctivae and EOM are normal.   Neck: Neck supple. Cardiovascular: Normal rate, regular rhythm and normal heart sounds. Pulmonary/Chest: Effort normal and breath sounds normal.   Abdominal: Soft.  Bowel sounds are normal. She exhibits mass (smooth firm mass right mid abdomen - patient indicates she has been advised by her gynecologist that this is the result of a uterine fibroid. ). There is tenderness (mild RUQ tenderness). There is no rebound and no guarding. No RUQ mass found on exam   Musculoskeletal: She exhibits no edema. Neurological: She is alert and oriented to person, place, and time. Skin: Skin is warm and dry. Psychiatric: She has a normal mood and affect. Her behavior is normal.   Nursing note and vitals reviewed. ASSESSMENT and PLAN    ICD-10-CM ICD-9-CM    1. RUQ abdominal pain R10.11 789.01 US ABD LTD      HEPATIC FUNCTION PANEL   2. Prediabetes R73.03 790.29 HEMOGLOBIN A1C W/O EAG      METABOLIC PANEL, BASIC   3. Vitamin D insufficiency E55.9 268.9    Further disposition pending lab and ultrasound results results if indicated. Follow up for new symptoms, worsening symptoms or failure to improve. Schedule annual PE in 3/2019    ADDENDUM 09/05/2018:  Gallbladder U/S from 09/01/2018  IMPRESSION:   Cholelithiasis. No common ductal dilatation.     Patient now reports consistent post prandial nausea  Agrees to referral for general surgery evaluation

## 2018-08-16 NOTE — PROGRESS NOTES
Mohan Iraheta is a 48 y.o. female here to knot in stomach       Mohan Iraheta is a 48 y.o. female (: 1965) presenting to address:    Chief Complaint   Patient presents with    Other     pt states since last week she noticed a knot in her stomach, nausea off and on        Vitals:    18 0726   BP: 130/82   Pulse: 68   Resp: 16   SpO2: 98%   Weight: 127 lb 9.6 oz (57.9 kg)   Height: 5' 1\" (1.549 m)   PainSc:   0 - No pain       Hearing/Vision:   No exam data present    Learning Assessment:     Learning Assessment 2017   PRIMARY LEARNER Patient   HIGHEST LEVEL OF EDUCATION - PRIMARY LEARNER  4 YEARS OF COLLEGE   BARRIERS PRIMARY LEARNER NONE   CO-LEARNER CAREGIVER No   PRIMARY LANGUAGE ENGLISH   LEARNER PREFERENCE PRIMARY DEMONSTRATION     VIDEOS   ANSWERED BY patient    RELATIONSHIP SELF     Depression Screening:     PHQ over the last two weeks 2018   Little interest or pleasure in doing things Not at all   Feeling down, depressed, irritable, or hopeless Not at all   Total Score PHQ 2 0     Fall Risk Assessment:   No flowsheet data found. Abuse Screening:   No flowsheet data found. Coordination of Care Questionaire:   1. Have you been to the ER, urgent care clinic since your last visit? Hospitalized since your last visit? NO    2. Have you seen or consulted any other health care providers outside of the [facility] since your last visit? Include any pap smears or colon screening. NO    Advanced Directive:   1. Do you have an Advanced Directive? NO    2. Would you like information on Advanced Directives?  NO

## 2018-09-01 ENCOUNTER — HOSPITAL ENCOUNTER (OUTPATIENT)
Dept: ULTRASOUND IMAGING | Age: 53
Discharge: HOME | End: 2018-09-01
Attending: FAMILY MEDICINE
Payer: COMMERCIAL

## 2018-09-01 DIAGNOSIS — R10.11 RUQ ABDOMINAL PAIN: ICD-10-CM

## 2018-09-01 PROCEDURE — 76705 ECHO EXAM OF ABDOMEN: CPT

## 2018-09-04 NOTE — PROGRESS NOTES
Please advise that abdominal ultrasound showed gallstones in the gallbladder but no evidence of infection or inflammation of the gallbladder. She should report back if she experiences right upper abdominal pain and or nausea after eating.

## 2018-09-05 NOTE — PROGRESS NOTES
Pt aware, pt states the R upper abdominal pain has somewhat resolved however she does have nausea after she eats all the time.

## 2018-09-19 ENCOUNTER — OFFICE VISIT (OUTPATIENT)
Dept: SURGERY | Age: 53
End: 2018-09-19

## 2018-09-19 VITALS
DIASTOLIC BLOOD PRESSURE: 82 MMHG | TEMPERATURE: 98.6 F | OXYGEN SATURATION: 100 % | HEIGHT: 61 IN | HEART RATE: 93 BPM | WEIGHT: 123 LBS | SYSTOLIC BLOOD PRESSURE: 122 MMHG | BODY MASS INDEX: 23.22 KG/M2

## 2018-09-19 DIAGNOSIS — R11.0 NAUSEA: ICD-10-CM

## 2018-09-19 DIAGNOSIS — K80.20 GALLSTONES: Primary | ICD-10-CM

## 2018-09-19 DIAGNOSIS — R10.11 RIGHT UPPER QUADRANT ABDOMINAL PAIN: ICD-10-CM

## 2018-09-19 NOTE — PATIENT INSTRUCTIONS
If you have any questions or concerns about today's appointment, the verbal and/or written instructions you were given for follow up care, please call our office at [phone]. [facility]  [address] [facility]  [place], [facility]    [phone] office  [phone] fax    PATIENT PRE AND POST [address]   [address]  [phone]      Before Surgery Instructions:   1) You must have someone available to drive you to and from your procedure and stay with you for the first 24 hours. 2) It is very important that you have nothing to eat or drink after midnight the night before your surgery. This includes chewing gum or sucking on hard candy. Take only heart, blood pressure and cholesterol medications the morning of surgery with only a sip of water. 3) Please stop taking Plavix 10 days prior to your surgery. Stop taking Coumadin 5 days prior to your surgery. Stop taking all Aspirin or Aspirin containing products 7 days prior to your surgery. Stop taking Advil, Motrin, Aleve, and etc. 3 days prior to your surgery. 4) If you take any diabetic medications please consult with your primary care physician on how to take them on the day of your surgery  5) Please stop all Herbal products 2 weeks prior to your surgery. 6) Please arrive at the hospital 2 hours prior to your surgery, unless you have been otherwise instructed. 7) Patients having an operation on their colon will be given a separate instruction sheet on their Bowel Prep. 8) For any pre-operative work up check in at the main entrance to [facility], and then go to Patient Registration. These studies are done on a walk in basis they are open from 7:00am to 5:00pm Monday through Friday. 9) Please wash your surgical site the morning of your surgery with soap and water.   10) If you are of child bearing age you will have pregnancy test done the morning of your surgery as soon as you arrive. 11) You may be contacted to change your surgery time. At times this is necessary due to equipment or staffing needs. After Surgery Instructions: You will need to be seen in the office for a follow-up visit 7-14 days after your surgery. Please call after you have had the procedure to make this appointment. Unless otherwise instructed, you may remove your outer bandage and shower 48 hours after your surgery. If you develop a fever greater than 101, have any significant drainage, bleeding, swelling and/or pus of the wound. Please call our office immediately. Surgery Date and Time:  Tuesday, October 16, 2018 at 7:30am    Please check in at [facility], enter through the Emergency Room entrance and go up to the second floor. Please check in by 5:30am the day of your surgery. You may contact Lu Munson with any questions at [phone].

## 2018-09-19 NOTE — PROGRESS NOTES
Patient presents today for referral form PCP for gallstones. Patient states that she does have intermittent pain and it is exacerbated by diet and does interrupt her sleep. 1. Have you been to the ER, urgent care clinic since your last visit? Hospitalized since your last visit? No    2. Have you seen or consulted any other health care providers outside of the [address] [place] [address] since your last visit? Include any pap smears or colon screening.  No

## 2018-09-19 NOTE — PROGRESS NOTES
General Surgery Consult      Marisabel Tolliver  Admit date: (Not on file)    MRN: [id number]     : 1965     Age: 48 y.o. Attending Physician: Cody Mason MD, FACS      Subjective:     Katerin Cornejo is a 48 y.o. female with a history of abdominal pain. The patient has stated that she has been having this pain for at least one year. The patient has a history of constipation for a long time and she had a colonoscopy last December that was negative. Her pain is localized in the right upper quadrant. It is also initiated with nausea. And most of the time the pain is postprandial.  The patient went to the emergency room recently with abdominal pain and ultrasound showed cholelithiasis with no evidence of acute cholecystitis. She was diagnosed with possible chronic cholecystitis and was referred to me for surgical evaluation. The patient had  in the past but no previous upper abdominal surgeries. She also has a history of reflux disease. The patient  has not had jaundice, acholic stools or dark urine and has not had a history of pancreatitis or hepatitis. Patient Active Problem List    Diagnosis Date Noted    Prediabetes 2018    Trapezius muscle spasm 2018    Gastroesophageal reflux disease 2018    Vitamin D insufficiency 2017     History reviewed. No pertinent past medical history.    Past Surgical History:   Procedure Laterality Date    HX  SECTION      HX HYSTEROSCOPY WITH ENDOMETRIAL ABLATION        Social History   Substance Use Topics    Smoking status: Never Smoker    Smokeless tobacco: Never Used    Alcohol use Yes      Comment: social drinker       History   Smoking Status    Never Smoker   Smokeless Tobacco    Never Used     Family History   Problem Relation Age of Onset    Hypertension Mother     Diabetes Mother     Hearing Impairment Mother     Kidney Disease Mother     Cancer Father      bladder cancer     Diabetes Father    Mary Rojas Diabetes Maternal Grandmother     Hypertension Maternal Grandmother     Heart Attack Maternal Grandmother     Cancer Maternal Grandfather      unknown    Heart Disease Neg Hx       Current Outpatient Prescriptions   Medication Sig    multivitamin (ONE A DAY) tablet Take 1 Tab by mouth daily.  biotin 10,000 mcg cap Take  by mouth. No current facility-administered medications for this visit. Allergies   Allergen Reactions    Aspirin Other (comments)     Pt reports bruising        Review of Systems:  Constitutional: negative  Eyes: negative  Ears, Nose, Mouth, Throat, and Face: negative  Respiratory: negative  Cardiovascular: negative  Gastrointestinal: positive for reflux symptoms, nausea, constipation and abdominal pain  Genitourinary:negative  Integument/Breast: negative  Hematologic/Lymphatic: negative  Musculoskeletal:negative  Neurological: negative  Behavioral/Psychiatric: negative  Endocrine: negative  Allergic/Immunologic: negative    Objective:     Visit Vitals    /87 (BP Patient Position: Sitting)    Pulse 93    Temp 98.6 °F (37 °C) (Oral)    Ht 5' 1\" (1.549 m)    Wt 55.8 kg (123 lb)    SpO2 100%    BMI 23.24 kg/m2       Physical Exam:      General:  in no apparent distress, alert, oriented times 3, anicteric and cooperative   Eyes:  conjunctivae and sclerae normal, pupils equal, round, reactive to light   Throat & Neck: no erythema or exudates noted and neck supple and symmetrical; no palpable masses   Lungs:   clear to auscultation bilaterally   Heart:  Regular rate and rhythm   Abdomen:   flat, soft, non tender except for right upper quadrant localized tenderness, nondistended, no masses or organomegaly. No abdominal wall hernias.    Extremities: extremities normal, atraumatic, no cyanosis or edema   Skin: Normal.         Imaging and Lab Review:     CBC:   Lab Results   Component Value Date/Time    WBC 4.0 (L) 03/20/2018 08:12 AM    RBC 4.30 03/20/2018 08:12 AM    HGB 12.6 03/20/2018 08:12 AM    HCT 38.8 03/20/2018 08:12 AM    PLATELET 660 46/53/2151 08:12 AM     BMP:   Lab Results   Component Value Date/Time    Glucose 85 08/16/2018 08:09 AM    Sodium 141 08/16/2018 08:09 AM    Potassium 4.3 08/16/2018 08:09 AM    Chloride 108 08/16/2018 08:09 AM    CO2 26 08/16/2018 08:09 AM    BUN 9 08/16/2018 08:09 AM    Creatinine 0.76 08/16/2018 08:09 AM    Calcium 9.1 08/16/2018 08:09 AM     CMP:  Lab Results   Component Value Date/Time    Glucose 85 08/16/2018 08:09 AM    Sodium 141 08/16/2018 08:09 AM    Potassium 4.3 08/16/2018 08:09 AM    Chloride 108 08/16/2018 08:09 AM    CO2 26 08/16/2018 08:09 AM    BUN 9 08/16/2018 08:09 AM    Creatinine 0.76 08/16/2018 08:09 AM    Calcium 9.1 08/16/2018 08:09 AM    Anion gap 7 08/16/2018 08:09 AM    BUN/Creatinine ratio 12 08/16/2018 08:09 AM    Alk. phosphatase 56 08/16/2018 08:09 AM    Protein, total 7.6 08/16/2018 08:09 AM    Albumin 4.0 08/16/2018 08:09 AM    Globulin 3.6 08/16/2018 08:09 AM    A-G Ratio 1.1 08/16/2018 08:09 AM       No results found for this or any previous visit (from the past 24 hour(s)). images and reports reviewed    Assessment:   Francesca Leal is a 48 y.o. female is presenting with abdominal pain and a picture of chronic cholecystitis. I explained the indications for robotic cholecystectomy as well as the alternatives. I discussed the potential risks, including but not limited to bleeding, wound infection, trocar injuries, bile duct injury and leak, and also the possible need for conversion to open procedure. I also explained the firefly technology and how it allow us to visualize the biliary tree to avoid bile duct injury or leak. She indicates that she understands the risks, accepts and wishes to proceed. Plan:     1. Will schedule for robotic single-site cholecystectomy with firefly (ICG) technology for identification of the biliary tree.    2. No heavy lifting (more than 15 pounds) for 2 weeks after the surgery. 3. Avoid constipation after the surgery (take stool softener).       Please call me if you have any questions (cell phone: [phone])     Signed By: Leslie Acosta MD     September 19, 2018

## 2018-09-19 NOTE — MR AVS SNAPSHOT
Tommy Marroquin 
 
 
 [address] [facility] [address] [address] 
[phone] Patient: Adrienne Arreola MRN: [id number] :1965 Visit Information Date & Time Provider Department Dept. Phone Encounter #  
 2018 11:30 AM Osorio Prieto [facility] [phone] [id number] Your Appointments 10/31/2018  8:30 AM  
POST OP with William Hennessy MD  
[address] [place] [facility]) Appt Note: 2 week post op [address] [facility] [address]  
  
   
 [address] [facility] [address] [place] [address] [address] 3/14/2019  3:30 PM  
PHYSICAL with Chino Julien MD  
[employer] [place] [facility]) Appt Note: CPE; CPE needed; CPE needed [address] [place] [id number]  
[phone]  
  
   
 [address] [place] [address] Upcoming Health Maintenance Date Due DTaP/Tdap/Td series (1 - Tdap) 3/6/1988 PAP AKA CERVICAL CYTOLOGY 3/6/1988 BREAST CANCER SCRN MAMMOGRAM 3/6/2017 FOBT Q 1 YEAR AGE 50-75 3/6/2017 Influenza Age 5 to Adult 2018 Allergies as of 2018  Review Complete On: 2018 By: Cameron Galvan LPN Severity Noted Reaction Type Reactions Aspirin  2017    Other (comments) Pt reports bruising Current Immunizations  Never Reviewed No immunizations on file. Not reviewed this visit You Were Diagnosed With   
  
 Codes Comments Gallstones    -  Primary ICD-10-CM: S99.16 ICD-9-CM: 574.20 Right upper quadrant abdominal pain     ICD-10-CM: R10.11 ICD-9-CM: 789.01 Nausea     ICD-10-CM: R11.0 ICD-9-CM: 787.02 Vitals BP Pulse Temp Height(growth percentile) Weight(growth percentile) SpO2  
 122/82 93 98.6 °F (37 °C) (Oral) 5' 1\" (1.549 m) 123 lb (55.8 kg) 100% BMI OB Status Smoking Status 23.24 kg/m2 Having regular periods Never Smoker Vitals History BMI and BSA Data Body Mass Index Body Surface Area  
 23.24 kg/m 2 1.55 m 2 Preferred Pharmacy Pharmacy Name Phone [facility] [address], [address] [facility] [address] [facility] [phone] Your Updated Medication List  
  
   
This list is accurate as of 9/19/18 12:14 PM.  Always use your most recent med list.  
  
  
  
  
 biotin 10,000 mcg Cap Take  by mouth.  
  
 multivitamin tablet Commonly known as:  ONE A DAY Take 1 Tab by mouth daily. Patient Instructions If you have any questions or concerns about today's appointment, the verbal and/or written instructions you were given for follow up care, please call our office at [phone]. [facility] [address] [place] [address], [address] [facility], [address] [place] [address] Road 
 
[phone] office [phone] fax PATIENT PRE AND POST OPERATIVE INSTRUCTIONS [address] [facility] [id number] [place] [address], [address] 
[phone] Before Surgery Instructions:  
1) You must have someone available to drive you to and from your procedure and stay with you for the first 24 hours. 2) It is very important that you have nothing to eat or drink after midnight the night before your surgery. This includes chewing gum or sucking on hard candy. Take only heart, blood pressure and cholesterol medications the morning of surgery with only a sip of water. 3) Please stop taking Plavix 10 days prior to your surgery. Stop taking Coumadin 5 days prior to your surgery. Stop taking all Aspirin or Aspirin containing products 7 days prior to your surgery. Stop taking Advil, Motrin, Aleve, and etc. 3 days prior to your surgery. 4) If you take any diabetic medications please consult with your primary care physician on how to take them on the day of your surgery 5) Please stop all Herbal products 2 weeks prior to your surgery. 6) Please arrive at the hospital 2 hours prior to your surgery, unless you have been otherwise instructed. 7) Patients having an operation on their colon will be given a separate instruction sheet on their Bowel Prep. 8) For any pre-operative work up check in at the main entrance to [address] [place] [address], and then go to Patient Registration. These studies are done on a walk in basis they are open from 7:00am to 5:00pm Monday through Friday. 9) Please wash your surgical site the morning of your surgery with soap and water. 10) If you are of child bearing age you will have pregnancy test done the morning of your surgery as soon as you arrive. 11) You may be contacted to change your surgery time. At times this is necessary due to equipment or staffing needs. After Surgery Instructions: You will need to be seen in the office for a follow-up visit 7-14 days after your surgery. Please call after you have had the procedure to make this appointment. Unless otherwise instructed, you may remove your outer bandage and shower 48 hours after your surgery. If you develop a fever greater than 101, have any significant drainage, bleeding, swelling and/or pus of the wound. Please call our office immediately. Surgery Date and Time:  Tuesday, October 16, 2018 at 7:30am 
 
Please check in at [facility], enter through the Emergency Room entrance and go up to the second floor. Please check in by 5:30am the day of your surgery. You may contact Leticia Garcia with any questions at [phone]. Patient Instructions History Introducing [facility]! Karan Joaquin introduces [employer] patient portal. Now you can access parts of your medical record, email your doctor's office, and request medication refills online.    
 
1. In your internet browser, go to [email or web address][email or web address] 2. Click on the First Time User? Click Here link in the Sign In box. You will see the New Member Sign Up page. 3. Enter your [employer] Access Code exactly as it appears below. You will not need to use this code after youve completed the sign-up process. If you do not sign up before the expiration date, you must request a new code. · [employer] Access Code: [email or web address] Expires: 11/14/2018  7:49 AM 
 
4. Enter the last four digits of your Social Security Number (xxxx) and Date of Birth (mm/dd/yyyy) as indicated and click Submit. You will be taken to the next sign-up page. 5. Create a "[employer]t ID. This will be your [employer] login ID and cannot be changed, so think of one that is secure and easy to remember. 6. Create a [employer] password. You can change your password at any time. 7. Enter your Password Reset Question and Answer. This can be used at a later time if you forget your password. 8. Enter your e-mail address. You will receive e-mail notification when new information is available in [address] 9. Click Sign Up. You can now view and download portions of your medical record. 10. Click the Download Summary menu link to download a portable copy of your medical information. If you have questions, please visit the Frequently Asked Questions section of the [employer] website. Remember, [employer] is NOT to be used for urgent needs. For medical emergencies, dial 911. Now available from your iPhone and Android! Please provide this summary of care documentation to your next provider. Your primary care clinician is listed as Iwona Deal. If you have any questions after today's visit, please call [phone].

## 2018-10-09 ENCOUNTER — TELEPHONE (OUTPATIENT)
Dept: SURGERY | Age: 53
End: 2018-10-09

## 2018-10-09 NOTE — TELEPHONE ENCOUNTER
Left voice mail for Mrs. Armas to contact our office to reschedule surgery because Dr. Sweta Calabrese has experienced a personal emergency

## 2018-10-10 ENCOUNTER — TELEPHONE (OUTPATIENT)
Dept: SURGERY | Age: 53
End: 2018-10-10

## 2018-10-10 NOTE — TELEPHONE ENCOUNTER
Spoke with Mrs. Armas to inform Dr. Russ Ortega will be out of the office due to a personal emergency for approximately one month. I explained to Mrs. Armas we could refer her to another surgeon with our practice (Dr. Yenni Tilley) however [facility]. Mrs. Armas opt to schedule an appointment with Dr. Andria Mortensen on Tuesday, October 16, 2018.

## 2018-10-16 ENCOUNTER — OFFICE VISIT (OUTPATIENT)
Dept: SURGERY | Age: 53
End: 2018-10-16

## 2018-10-16 VITALS
OXYGEN SATURATION: 97 % | RESPIRATION RATE: 18 BRPM | TEMPERATURE: 99 F | WEIGHT: 122.2 LBS | HEIGHT: 61 IN | BODY MASS INDEX: 23.07 KG/M2 | HEART RATE: 86 BPM | DIASTOLIC BLOOD PRESSURE: 90 MMHG | SYSTOLIC BLOOD PRESSURE: 138 MMHG

## 2018-10-16 DIAGNOSIS — K80.20 GALLSTONES: Primary | ICD-10-CM

## 2018-10-16 DIAGNOSIS — R19.04 LEFT LOWER QUADRANT ABDOMINAL MASS: ICD-10-CM

## 2018-10-16 NOTE — PROGRESS NOTES
[facility]  General Surgery    Subjective:      HPI: Patient is a very pleasant 62-year-old female with a past medical history remarkable for prediabetes, vitamin D insufficiency, gastroesophageal reflux disease, trapezius muscle spasm and  delivery. She was referred for evaluation and management of cholecystitis. Patient has a history of abdominal pain which is been present for approximately 6-12 months off and on. The pain is in the right upper quadrant usually occurs after eating fatty foods and is accompanied by bloating reflux and sometimes nausea vomiting. The pain also sometimes radiates into the shoulder. The patient states that she has suffered with chronic constipation for the past 10-15 years. She recently has adjusted her diet out of fear of having biliary colic or cholecystitis. She does not eat any fruit. She eats a salad every night outside of that she does not eat any vegetables. She has been seen by Dr. Antoni Alberto and scheduled for single site robotic assisted cholecystectomy. Patient Active Problem List    Diagnosis Date Noted    Prediabetes 2018    Trapezius muscle spasm 2018    Gastroesophageal reflux disease 2018    Vitamin D insufficiency 2017     History reviewed. No pertinent past medical history.    Past Surgical History:   Procedure Laterality Date    HX  SECTION      HX HYSTEROSCOPY WITH ENDOMETRIAL ABLATION        Family History   Problem Relation Age of Onset    Hypertension Mother     Diabetes Mother     Hearing Impairment Mother     Kidney Disease Mother     Cancer Father      bladder cancer     Diabetes Father     Diabetes Maternal Grandmother     Hypertension Maternal Grandmother     Heart Attack Maternal Grandmother     Cancer Maternal Grandfather      unknown    Heart Disease Neg Hx       Social History   Substance Use Topics    Smoking status: Never Smoker    Smokeless tobacco: Never Used   [facility] Alcohol use Yes      Comment: social drinker       Allergies   Allergen Reactions    Aspirin Other (comments)     Pt reports bruising       Prior to Admission medications    Medication Sig Start Date End Date Taking? Authorizing Provider   biotin 10,000 mcg cap Take  by mouth. Yes Historical Provider   multivitamin (ONE A DAY) tablet Take 1 Tab by mouth daily. Historical Provider       Review of Systems:    14 systems were reviewed. The results are as above in the HPI and otherwise negative. Objective:     Vitals:    10/16/18 1301   BP: 138/90   Pulse: 86   Resp: 18   Temp: 99 °F (37.2 °C)   SpO2: 97%   Weight: 55.4 kg (122 lb 3.2 oz)   Height: 5' 1\" (1.549 m)       Physical Exam:  GENERAL: alert, cooperative, no distress, appears stated age,   EYE: conjunctivae/corneas clear. PERRL, EOM's intact. THROAT & NECK: normal and no erythema or exudates noted. ,    LYMPHATIC: Cervical, supraclavicular, and axillary nodes normal. ,   LUNG: clear to auscultation bilaterally,   HEART: regular rate and rhythm, S1, S2 normal, no murmur, click, rub or gallop,   ABDOMEN: soft, nondistended, tender right upper quadrant without Blanco sign no rebound or guarding. A mass is palpable in the left lower quadrant of the abdomen. This area is also tender to touch. The mass appears to be deep to the abdominal wall. Bowel sounds normal. No masses,  no organomegaly,   EXTREMITIES:  extremities normal, atraumatic, no cyanosis or edema,   SKIN: Normal.,   NEUROLOGIC: AOx3. Cranial nerves 2-12 and sensation grossly intact. ,     Data Review:  He came to the I reviewed the ultrasound of the abdomen independently on the monitor. I agree with the finding of gallstones without gallbladder wall thickening or pericholecystic fluid. Ms. Seth Villagran has a reminder for a \"due or due soon\" health maintenance. I have asked that she contact her primary care provider for follow-up on this health maintenance.   Impression:     · Patient with cholelithiasis and biliary colic possible chronic calculus cholecystitis also with a mass in the left lower quadrant of the abdomen of unclear etiology. Plan:     · CT scan of the abdomen pelvis with p.o. contrast and IV contrast to evaluate the left lower quadrant abdominal mass  · Patient will follow with us after the CAT scan is completed for surgical evaluation.     Signed By: Yogi Roberts MD     October 16, 2018

## 2018-10-16 NOTE — MR AVS SNAPSHOT
[address] [facility] [address] [facility] 
[phone] Patient: Adan Dacosta MRN: [id number] :1965 Visit Information Date & Time Provider Department Dept. Phone Encounter #  
 10/16/2018  1:00 PM [facility] [phone] [id number] Your Appointments 10/30/2018  1:00 PM  
Follow Up with MD JUSTIN Sewell [facility] Appt Note: 2 week f/up [address] [facility] [address] [id number]  
  
    
 3/14/2019  3:30 PM  
PHYSICAL with Lizeth Smith MD  
[address] [facility]) Appt Note: CPE; CPE needed; CPE needed [address] [facility] [address] [phone]  
  
   
 [address] [facility] [address] [facility] Upcoming Health Maintenance Date Due  
 PAP AKA CERVICAL CYTOLOGY 3/6/1988 Shingrix Vaccine Age 50> (1 of 2) 3/6/2017 BREAST CANCER SCRN MAMMOGRAM 3/6/2017 FOBT Q 1 YEAR AGE 50-75 3/6/2017 Influenza Age 5 to Adult 2018 DTaP/Tdap/Td series (2 - Td) 3/1/2027 Allergies as of 10/16/2018  Review Complete On: 10/16/2018 By: Arthur Smalls MD  
  
 Severity Noted Reaction Type Reactions Aspirin  2017    Other (comments) Pt reports bruising Current Immunizations  Never Reviewed No immunizations on file. Not reviewed this visit You Were Diagnosed With   
  
 Codes Comments Gallstones    -  Primary ICD-10-CM: B55.02 ICD-9-CM: 574.20 Left lower quadrant abdominal mass     ICD-10-CM: R19.04 
ICD-9-CM: 789.34 Vitals BP Pulse Temp Resp Height(growth percentile) Weight(growth percentile)  138/90 (BP 1 Location: Left arm, BP Patient Position: Sitting) 86 99 °F (37.2 °C) 18 5' 1\" (1.549 m) 122 lb 3.2 oz (55.4 kg) LMP SpO2 BMI OB Status Smoking Status 10/08/2018 (Exact Date) 97% 23.09 kg/m2 Having regular periods Never Smoker Vitals History BMI and BSA Data Body Mass Index Body Surface Area 23.09 kg/m 2 1.54 m 2 Preferred Pharmacy Pharmacy Name Phone [facility] [address], [address] [facility] [address] [phone] Your Updated Medication List  
  
   
This list is accurate as of 10/16/18  2:25 PM.  Always use your most recent med list.  
  
  
  
  
 biotin 10,000 mcg Cap Take  by mouth.  
  
 multivitamin tablet Commonly known as:  ONE A DAY Take 1 Tab by mouth daily. Introducing [address] [facility] introduces [employer] patient portal. Now you can access parts of your medical record, email your doctor's office, and request medication refills online. 1. In your internet browser, go to [email or web address] 2. Click on the First Time User? Click Here link in the Sign In box. You will see the New Member Sign Up page. 3. Enter your [employer] Access Code exactly as it appears below. You will not need to use this code after youve completed the sign-up process. If you do not sign up before the expiration date, you must request a new code. · [employer] Access Code: [email or web address] Expires: 11/14/2018  7:49 AM 
 
4. Enter the last four digits of your Social Security Number (xxxx) and Date of Birth (mm/dd/yyyy) as indicated and click Submit. You will be taken to the next sign-up page. 5. Create a [employer] ID. This will be your [employer] login ID and cannot be changed, so think of one that is secure and easy to remember. 6. Create a [employer] password. You can change your password at any time. 7. Enter your Password Reset Question and Answer. This can be used at a later time if you forget your password. 8. Enter your e-mail address. You will receive e-mail notification when new information is available in [address] 9. Click Sign Up. You can now view and download portions of your medical record. 10. Click the Download Summary menu link to download a portable copy of your medical information. If you have questions, please visit the Frequently Asked Questions section of the [employer] website. Remember, [employer] is NOT to be used for urgent needs. For medical emergencies, dial 911. Now available from your iPhone and Android! Please provide this summary of care documentation to your next provider. Your primary care clinician is listed as Cristiana Waller. If you have any questions after today's visit, please call [phone].

## 2018-10-16 NOTE — PROGRESS NOTES
Chief Complaint   Patient presents with    Pre-op Exam     US 9/11/18 Cholelithiasis     1. Have you been to the ER, urgent care clinic since your last visit? Hospitalized since your last visit? No    2. Have you seen or consulted any other health care providers outside of the [address] [place] [address] since your last visit? Include any pap smears or colon screening. No      Pt states has been having discomfort in lower part of stomach states it feels like a rock. Pt states has mild constipation and took Murelax.

## 2018-10-26 ENCOUNTER — HOSPITAL ENCOUNTER (OUTPATIENT)
Dept: CT IMAGING | Age: 53
Discharge: HOME | End: 2018-10-26
Attending: SURGERY
Payer: COMMERCIAL

## 2018-10-26 DIAGNOSIS — R19.04 LEFT LOWER QUADRANT ABDOMINAL MASS: ICD-10-CM

## 2018-10-26 PROCEDURE — 74011000255 HC RX REV CODE- 255: Performed by: SURGERY

## 2018-10-26 PROCEDURE — 74011636320 HC RX REV CODE- 636/320: Performed by: SURGERY

## 2018-10-26 PROCEDURE — 74177 CT ABD & PELVIS W/CONTRAST: CPT

## 2018-10-26 RX ORDER — BARIUM SULFATE 20 MG/ML
900 SUSPENSION ORAL
Status: COMPLETED | OUTPATIENT
Start: 2018-10-26 | End: 2018-10-26

## 2018-10-26 RX ADMIN — BARIUM SULFATE 900 ML: 20 SUSPENSION ORAL at 07:24

## 2018-10-26 RX ADMIN — IOPAMIDOL 93 ML: 612 INJECTION, SOLUTION INTRAVENOUS at 07:25

## 2018-10-30 ENCOUNTER — OFFICE VISIT (OUTPATIENT)
Dept: SURGERY | Age: 53
End: 2018-10-30

## 2018-10-30 VITALS
BODY MASS INDEX: 22.69 KG/M2 | OXYGEN SATURATION: 100 % | RESPIRATION RATE: 16 BRPM | DIASTOLIC BLOOD PRESSURE: 82 MMHG | SYSTOLIC BLOOD PRESSURE: 110 MMHG | HEIGHT: 61 IN | WEIGHT: 120.2 LBS | TEMPERATURE: 98 F | HEART RATE: 92 BPM

## 2018-10-30 DIAGNOSIS — D21.9 FIBROIDS: ICD-10-CM

## 2018-10-30 DIAGNOSIS — K80.10 CHRONIC CHOLECYSTITIS WITH CALCULUS: Primary | ICD-10-CM

## 2018-10-30 RX ORDER — LATANOPROST 50 UG/ML
SOLUTION/ DROPS OPHTHALMIC
COMMUNITY

## 2018-10-30 NOTE — H&P
[facility]  General Surgery    Subjective:      HPI: Patient is a very pleasant 72-year-old female with past medical history remarkable for prediabetes, vitamin D deficiency, cholelithiasis and gastroesophageal reflux disease. I saw her last week regarding the cholelithiasis and GERD. She was found to have a mass palpable in the left lower quadrant. A CT scan of the abdomen pelvis with oral contrast was obtained. I reviewed the CT independently on the monitor. I agree with the findings of large uterine fibroids extending to the level of the umbilicus. I called and discussed the patient's case with Dr. Sharan Khan of GYN oncology to assess for possible removal of the fibroids or hysterectomy at the same time of cholecystectomy. Dr. Aaliyah Morel will review the patient's studies and see her in consultation to determine the best course of action. Patient Active Problem List    Diagnosis Date Noted    Prediabetes 2018    Trapezius muscle spasm 2018    Gastroesophageal reflux disease 2018    Vitamin D insufficiency 2017     History reviewed. No pertinent past medical history.    Past Surgical History:   Procedure Laterality Date    HX  SECTION      HX COLONOSCOPY N/A 2017    normal    HX HYSTEROSCOPY WITH ENDOMETRIAL ABLATION        Family History   Problem Relation Age of Onset    Hypertension Mother     Diabetes Mother     Hearing Impairment Mother     Kidney Disease Mother     Cancer Father         bladder cancer     Diabetes Father     Diabetes Maternal Grandmother     Hypertension Maternal Grandmother     Heart Attack Maternal Grandmother     Cancer Maternal Grandfather         unknown    Heart Disease Neg Hx       Social History     Tobacco Use    Smoking status: Never Smoker    Smokeless tobacco: Never Used   Substance Use Topics    Alcohol use: Yes     Comment: social drinker       Allergies   Allergen Reactions    Aspirin Other (comments)     Pt reports bruising       Prior to Admission medications    Medication Sig Start Date End Date Taking? Authorizing Provider   latanoprost (XALATAN) 0.005 % ophthalmic solution latanoprost 0.005 % eye drops   INSTILL 1 DROP IN BOTH EYES AT BEDTIME   Yes Provider, Historical   multivitamin (ONE A DAY) tablet Take 1 Tab by mouth daily. Yes Provider, Historical   biotin 10,000 mcg cap Take  by mouth. Yes Provider, Historical       Review of Systems:    14 systems were reviewed. The results are as above in the HPI and otherwise negative. Objective:     Vitals:    10/30/18 1308   BP: 110/82   Pulse: 92   Resp: 16   Temp: 98 °F (36.7 °C)   TempSrc: Oral   SpO2: 100%   Weight: 54.5 kg (120 lb 3.2 oz)   Height: 5' 1\" (1.549 m)       Physical Exam:  GENERAL: alert, cooperative, no distress, appears stated age,   EYE: conjunctivae/corneas clear. PERRL, EOM's intact. THROAT & NECK: normal and no erythema or exudates noted. ,    LYMPHATIC: Cervical, supraclavicular, and axillary nodes normal. ,   LUNG: clear to auscultation bilaterally,   HEART: regular rate and rhythm, S1, S2 normal, no murmur, click, rub or gallop,   ABDOMEN: soft, non-distended, tender right upper quadrant, tender left lower quadrant, firm mass palpable in the left lower quadrant consistent with uterine fibroid. EXTREMITIES:  extremities normal, atraumatic, no cyanosis or edema,   SKIN: Normal.,   NEUROLOGIC: AOx3. Cranial nerves 2-12 and sensation grossly intact. ,     Data Review:  to be done    Ms. Derrek Valenzuela has a reminder for a \"due or due soon\" health maintenance. I have asked that she contact her primary care provider for follow-up on this health maintenance. Impression:     · Patient with chronic calculus cholecystitis and constipation and large uterine fibroids. Plan:     Single site robot-assisted laparoscopic cholecystectomy possible in conjunction with robotic myomectomy or open hysterectomy per Dr. Lisa Fountain.   Consent on chart  Preoperative orders written    Signed By: Frank Pizarro MD     October 30, 2018

## 2018-10-30 NOTE — PROGRESS NOTES
Chief Complaint   Patient presents with    Follow-up     f/u CT results LLQ abd Mass     1. Have you been to the ER, urgent care clinic since your last visit? Hospitalized since your last visit? No    2. Have you seen or consulted any other health care providers outside of the [address] [place] [address] since your last visit? Include any pap smears or colon screening.  No

## 2018-11-12 ENCOUNTER — TELEPHONE (OUTPATIENT)
Dept: SURGERY | Age: 53
End: 2018-11-12

## 2018-11-12 NOTE — TELEPHONE ENCOUNTER
Spoke with Mrs. Armas to inform her Dr. Yair Rutledge has returned. Mrs. Robert Urena states she was seen by Dr. Denita Vazquez and a CT was ordered which revealed uterine fibroids so she desire to have gyn surgery and the gallbladder surgery simultaneously however the gyn Dr. Denita Vazquez referred her to is not in her network. I gave Mrs. [facility] GYN providers (Dr. Shaka Mckeon, Dr. Ava Sevilla, and Dr. Nubia Gage) contact information to inquire with her insurance if they're in network with her insurance plan. Mrs. Robert Urena states she will check with her insurance company and get back with us regarding her decision to proceed with surgery (cholecystectomy).

## 2018-12-06 ENCOUNTER — OFFICE VISIT (OUTPATIENT)
Dept: OBGYN CLINIC | Age: 53
End: 2018-12-06

## 2018-12-06 VITALS
DIASTOLIC BLOOD PRESSURE: 78 MMHG | BODY MASS INDEX: 22.77 KG/M2 | OXYGEN SATURATION: 100 % | HEART RATE: 102 BPM | WEIGHT: 120.6 LBS | RESPIRATION RATE: 16 BRPM | HEIGHT: 61 IN | SYSTOLIC BLOOD PRESSURE: 129 MMHG | TEMPERATURE: 99.8 F

## 2018-12-06 DIAGNOSIS — K80.00 CALCULUS OF GALLBLADDER WITH ACUTE CHOLECYSTITIS WITHOUT OBSTRUCTION: ICD-10-CM

## 2018-12-06 DIAGNOSIS — D21.9 FIBROIDS: Primary | ICD-10-CM

## 2018-12-06 NOTE — PROGRESS NOTES
Progress Note Patient: Sharad Salazar  MRN: [id number]  Date: 12/6/2018 Age:  48 y.o.,      Sex: female    YOB: 1965 Sharad Salazar is a 48y.o. year-old female, G 5 P 2  whose last normal menstrual period was 2005. She admits to abnormal vaginal bleeding. She has had an Endometrial ablation but admits to continual abnormal uterine bleeding secondary to the large uterine fibroids. She has also been diagnosed to have symptomatic Cholelithiasis and she is planning to have a cholecystectomy. She presents in the office for evaluation of the symptomatic uterine fibroids and for a definitive treatment. CT Results (most recent): 
Results from Hospital Encounter encounter on 10/26/18 CT ABD PELV W CONT Narrative EXAM: CT of the abdomen and pelvis INDICATION: 29-year-old patient with left lower quadrant abdominal mass. COMPARISON: Correlation made with ultrasound 9/1/2018. No prior CT imaging is 
available for review. TECHNIQUE: Axial CT imaging of the abdomen and pelvis was performed with oral 
and intravenous contrast. Multiplanar reformats were generated. One or more dose reduction techniques were used on this CT: automated exposure 
control, adjustment of the mAs and/or kVp according to patient's size, and 
iterative reconstruction techniques. The specific techniques utilized on this CT 
exam have been documented in the patient's electronic medical record.  
 
_______________ FINDINGS: 
 
LOWER CHEST: Minor atelectasis is present at the lung bases. No pneumonic 
opacity or pleural effusion. Normal cardiac size without pericardial effusion. LIVER, BILIARY: Liver is normal. No biliary dilation. The gallbladder contains a 
single calcified gallstone. PANCREAS: Normal. 
 
SPLEEN: Normal. 
 
ADRENALS: Normal. 
 
KIDNEYS/URETERS/BLADDER: Renal parenchymal enhancement is symmetric. No 
hydronephrosis or nephrolithiasis.  No distal ureteral or urinary bladder stone. 
 
PELVIC ORGANS: Markedly enlarged and heterogeneously enhancing uterus 
demonstrated with multiple leiomyomata present. Estimated size of the uterus, to 
include the leiomyomata is approximately 13.2 x 8.9 x 15.1 cm (greatest diameter 
craniocaudal). Dominant fundal fibroid noted to project to the level of the 
umbilicus. VASCULATURE: Unremarkable LYMPH NODES: No enlarged lymph nodes. GASTROINTESTINAL TRACT: No bowel dilation or wall thickening. Normal appendix. BONES: No acute or aggressive osseous abnormalities identified. Hypertrophy 
transverse process of the right L5 vertebral body present with associated 
assimilation joint. OTHER: None. 
 
_______________ Impression IMPRESSION: 
 
1. Considerably enlarged and multiple myomatous uterus, likely in keeping with 
the provided history of palpable abdominal mass. The largest fibroid which 
projects from the region of the uterine fundus is present at the level of the 
umbilicus. 2. No abdominal or pelvic adenopathy. 3. Normal caliber small and large intestine, to include a normal appendix. 4. Cholelithiasis. Review of Systems: General, Cardiovascular, Respiratory, Gastrointestinal, Genitourinary, Neuropsychiatry, Musculoskeletal. 
 
Patient denies any problems associated with these systems except for the above mentioned problems. Manleyjh CunninghamRooney General Examination: She is a well-developed, well-nourished female in no acute distress. HEENT--all within normal limits. Lungs--clear to A&P. Cardiovascular system--normal sinus rhythm, no murmurs or  
            gallop. Abdomen--soft, nontender, and normal active. Pelvic exam--External genitalia and BUS--normal.          
  Cervix: Normal 
  Vagina: vaginal discharge normal and physiologic Uterus: enlarged, 16-18 weeks size, irregular, 
                        retroflexed and firm.  
  Adnexa: normal bimanual exam 
 
 Impression. ---Symptomatic Large Uterine fibroids and a symptomatic Cholelithia Plan: --Patient will be scheduled for a KAYCE & BSO by edgar Ledezma The Cholecystectomy will be done by the Surgeon (Dr Ilia Almanzar). The patient also need to a Pap smear done prior to the surgery. RTC--prn. Sunni Hernandez MD 
December 6, 2018

## 2018-12-08 NOTE — PATIENT INSTRUCTIONS
Learning About Hysterectomy Surgery What is a hysterectomy? A hysterectomy is surgery to take out the uterus. Sometimes the ovaries and fallopian tubes also are removed at the same time. How is this surgery done? There are many ways to do the surgery. The type you have depends on your medical condition. It also depends on your overall health. Talk with your doctor about which type is right for you. Abdominal surgery This is done through a cut, called an incision, that the doctor makes in the lower belly. Your doctor may do surgery this way if: 
· You have a lot of scar tissue around your uterus. · Your uterus is very large. · You have cancer of the uterus or cervix. · Your doctor needs to check the area around your uterus for problems. Vaginal surgery This is done through the vagina. Your doctor may do surgery this way if: 
· Your uterus is normal in size. The smaller size means it can be removed easily. · You do not have endometriosis. (It is a problem that causes tissue in the uterus to grow in other parts of the body.) · You do not have cancer of the uterus or cervix. Laparoscopically assisted vaginal surgery This is done through the vagina and one or more small cuts in the belly. The doctor will insert a laparoscope and special tools through the cuts in the belly. The scope and tools help free the uterus. Then it is removed through the vagina. Laparoscopic supracervical surgery This is done through several small cuts in the belly. The doctor inserts a scope and special tools through the cuts in the belly. He or she takes out the uterus in small pieces through the cuts. The cervix is left in place. This is an option when cancer of the cervix is not a concern. In rare cases, during a vaginal or laparoscopic surgery, the doctor may find a problem that makes abdominal surgery a better choice. If this happens, the doctor will take out the uterus through a cut in the lower belly. What can you expect after your surgery? Recovery can take from 4 to 6 weeks. It depends on which type of surgery you have. You will need help around the house while you get better. You won't be able to do any heavy lifting. And you will have to take it easy for a few weeks. It is normal to feel more tired than usual. You also may notice that your emotions go up and down more than usual for a while. After surgery, you will no longer have periods. You will not be able to get pregnant. If there is a chance that you will want to have a baby in the future, talk to your doctor about other treatment options. The surgery should not lower your interest in sex after you have healed. In fact, some women enjoy sex more. They no longer have to worry about birth control or heavy bleeding. Some women have vaginal dryness after the surgery. It can make sex less comfortable. A vaginal lubricant, such as Astroglide or K-Y Jelly, can help. You may need to take hormone pills after your surgery if your ovaries are removed and you have not gone through menopause. Taking out the ovaries before menopause causes a sudden drop in the hormone estrogen. This increases your risk of getting weak and brittle bones (osteoporosis). Although estrogen therapy lowers this risk, it does slightly raise the risk of some other problems. These include breast cancer and stroke. Talk with your doctor about the pros and cons of taking estrogen if you have your ovaries removed. Follow-up care is a key part of your treatment and safety. Be sure to make and go to all appointments, and call your doctor if you are having problems. It is also a good idea to know your test results and keep a list of the medicines you take. Where can you learn more? Go to http://ryder-mynor.info/. Enter 072 9928 in the search box to learn more about \"Learning About Hysterectomy Surgery. \" Current as of: October 6, 2017 Content Version: 11.8 © 4837-2827 hopscout. Care instructions adapted under license by [employer] (which disclaims liability or warranty for this information). If you have questions about a medical condition or this instruction, always ask your healthcare professional. [address] any warranty or liability for your use of this information. Uterine Fibroids: Care Instructions Your Care Instructions Uterine fibroids are growths in the uterus. Fibroids aren't cancer. Doctors don't know what causes fibroids. Fibroids are very common in women during their childbearing years. Fibroids can grow on the inside of the uterus, in the muscle wall of the uterus, or near the outside wall of the uterus. In some women, fibroids cause painful cramps and heavy periods. In these cases, taking anti-inflammatory medicines, birth control pills, or using an intrauterine device (IUD) often helps decrease symptoms. Sometimes surgery is needed to treat fibroids. But if you are near menopause, you may want to wait and see if your symptoms get better. Most fibroids shrink and go away after menopause, when your menstrual periods stop completely. Follow-up care is a key part of your treatment and safety. Be sure to make and go to all appointments, and call your doctor if you are having problems. It's also a good idea to know your test results and keep a list of the medicines you take. How can you care for yourself at home? · If your doctor gave you medicine, take it as exactly as prescribed. Be safe with medicines. Call your doctor if you think you are having a problem with your medicine. · Take anti-inflammatory medicines for pain. These include ibuprofen (Advil, Motrin) and naproxen (Aleve). Read and follow all instructions on the label. · Use heat, such as a hot water bottle or a heating pad set on low, or a warm bath to relax tense muscles and relieve cramping.  Put a thin cloth between the heating pad and your skin. Never go to sleep with a heating pad on. · Lie down and put a pillow under your knees. Or, lie on your side and bring your knees up to your chest. These positions may help relieve belly pain or pressure. · Keep track of how many sanitary pads or tampons you use each day. · Get at least 30 minutes of exercise on most days of the week. Walking is a good choice. You also may want to do other activities, such as running, swimming, cycling, or playing tennis or team sports. · If you bleed longer than usual or have heavy bleeding, take a daily multivitamin with iron. When should you call for help? Call your doctor now or seek immediate medical care if: 
  · You have severe vaginal bleeding.  
  · You have new or worse belly or pelvic pain.  
 Watch closely for changes in your health, and be sure to contact your doctor if: 
  · You have unusual vaginal bleeding.  
  · You do not get better as expected. Where can you learn more? Go to http://ryder-mynor.info/. Enter B121 in the search box to learn more about \"Uterine Fibroids: Care Instructions. \" Current as of: May 15, 2018 Content Version: 11.8 © 5430-9903 Workspot. Care instructions adapted under license by [employer] (which disclaims liability or warranty for this information). If you have questions about a medical condition or this instruction, always ask your healthcare professional. [place] [address] any warranty or liability for your use of this information. Learning About Acute Cholecystitis What is cholecystitis? Cholecystitis (say \"koh-lih-sis-TY-tus\") is inflammation of the gallbladder. The gallbladder stores bile. Bile helps the body digest food. Normally, the bile flows from the gallbladder to the small intestine.  
A gallstone stuck in the cystic duct is most often the cause of sudden (acute) cholecystitis. The cystic duct is the tube that carries the bile out of the gallbladder. The gallstone blocks the bile from leaving the gallbladder. This results in an irritated and swollen gallbladder. The disease can also be caused by infection or trauma, such as an injury from a car accident. Cholecystitis has to be treated right away. You will probably have to go to the hospital. Surgery is the usual treatment. What are the symptoms? Symptoms include: · Steady and severe pain in the upper right part of belly. This is the most common symptom. The pain can sometimes move to your back or right shoulder blade. It may last for more than 6 hours. · Nausea or vomiting. · A fever. How is it treated? The main way to treat this disease is surgery to remove the gallbladder. This surgery can often be done through small cuts (incisions) in the belly. This is called a laparoscopic cholecystectomy. In some cases, you may need a more extensive surgery. You may need surgery as soon as possible. The doctor may try to reduce swelling and irritation in the gallbladder before removing it. You may be given fluids and antibiotics through an IV. You may also be given pain medicine. Follow-up care is a key part of your treatment and safety. Be sure to make and go to all appointments, and call your doctor if you are having problems. It's also a good idea to know your test results and keep a list of the medicines you take. Where can you learn more? Go to http://ryder-mynor.info/. Enter T940 in the search box to learn more about \"Learning About Acute Cholecystitis. \" Current as of: March 28, 2018 Content Version: 11.8 © 5485-7364 [employer]oDex. Care instructions adapted under license by [employer] (which disclaims liability or warranty for this information).  If you have questions about a medical condition or this instruction, always ask your healthcare professional. [place] [address] any warranty or liability for your use of this information.

## 2018-12-19 ENCOUNTER — TELEPHONE (OUTPATIENT)
Dept: OBGYN CLINIC | Age: 53
End: 2018-12-19

## 2018-12-19 NOTE — TELEPHONE ENCOUNTER
Patient came into the office to sign her surgery papers, and while she was here, she asked about her FMLA paper work being faxed to her employers. I advised her that Kerry Miller is the nurse which do our FMLA form will ask her to followup with this.

## 2018-12-24 ENCOUNTER — TELEPHONE (OUTPATIENT)
Dept: SURGERY | Age: 53
End: 2018-12-24

## 2019-01-07 ENCOUNTER — HOSPITAL ENCOUNTER (OUTPATIENT)
Dept: PREADMISSION TESTING | Age: 54
Discharge: HOME | End: 2019-01-07
Payer: COMMERCIAL

## 2019-01-07 LAB
ABO + RH BLD: NORMAL
ALBUMIN SERPL-MCNC: 3.9 G/DL (ref 3.4–5)
ALBUMIN/GLOB SERPL: 1.1 {RATIO} (ref 0.8–1.7)
ALP SERPL-CCNC: 65 U/L (ref 45–117)
ALT SERPL-CCNC: 59 U/L (ref 13–56)
ANION GAP SERPL CALC-SCNC: 6 MMOL/L (ref 3–18)
APPEARANCE UR: CLEAR
AST SERPL-CCNC: 41 U/L (ref 15–37)
BACTERIA URNS QL MICRO: ABNORMAL /HPF
BASOPHILS # BLD: 0 K/UL (ref 0–0.1)
BASOPHILS NFR BLD: 0 % (ref 0–2)
BILIRUB SERPL-MCNC: 0.5 MG/DL (ref 0.2–1)
BILIRUB UR QL: NEGATIVE
BLOOD GROUP ANTIBODIES SERPL: NORMAL
BUN SERPL-MCNC: 9 MG/DL (ref 7–18)
BUN/CREAT SERPL: 13 (ref 12–20)
CALCIUM SERPL-MCNC: 8.2 MG/DL (ref 8.5–10.1)
CHLORIDE SERPL-SCNC: 108 MMOL/L (ref 100–108)
CO2 SERPL-SCNC: 28 MMOL/L (ref 21–32)
COLOR UR: YELLOW
CREAT SERPL-MCNC: 0.67 MG/DL (ref 0.6–1.3)
DIFFERENTIAL METHOD BLD: ABNORMAL
EOSINOPHIL # BLD: 0.1 K/UL (ref 0–0.4)
EOSINOPHIL NFR BLD: 3 % (ref 0–5)
EPITH CASTS URNS QL MICRO: ABNORMAL /LPF (ref 0–5)
ERYTHROCYTE [DISTWIDTH] IN BLOOD BY AUTOMATED COUNT: 12.5 % (ref 11.6–14.5)
GLOBULIN SER CALC-MCNC: 3.5 G/DL (ref 2–4)
GLUCOSE SERPL-MCNC: 79 MG/DL (ref 74–99)
GLUCOSE UR STRIP.AUTO-MCNC: NEGATIVE MG/DL
HCG SERPL QL: NEGATIVE
HCT VFR BLD AUTO: 36.3 % (ref 35–45)
HGB BLD-MCNC: 11.5 G/DL (ref 12–16)
HGB UR QL STRIP: NEGATIVE
KETONES UR QL STRIP.AUTO: NEGATIVE MG/DL
LEUKOCYTE ESTERASE UR QL STRIP.AUTO: ABNORMAL
LYMPHOCYTES # BLD: 1.7 K/UL (ref 0.9–3.6)
LYMPHOCYTES NFR BLD: 49 % (ref 21–52)
MCH RBC QN AUTO: 27.6 PG (ref 24–34)
MCHC RBC AUTO-ENTMCNC: 31.7 G/DL (ref 31–37)
MCV RBC AUTO: 87.3 FL (ref 74–97)
MONOCYTES # BLD: 0.3 K/UL (ref 0.05–1.2)
MONOCYTES NFR BLD: 9 % (ref 3–10)
NEUTS SEG # BLD: 1.3 K/UL (ref 1.8–8)
NEUTS SEG NFR BLD: 39 % (ref 40–73)
NITRITE UR QL STRIP.AUTO: NEGATIVE
PH UR STRIP: 7 [PH] (ref 5–8)
PLATELET # BLD AUTO: 164 K/UL (ref 135–420)
PMV BLD AUTO: 10.6 FL (ref 9.2–11.8)
POTASSIUM SERPL-SCNC: 4 MMOL/L (ref 3.5–5.5)
PROT SERPL-MCNC: 7.4 G/DL (ref 6.4–8.2)
PROT UR STRIP-MCNC: NEGATIVE MG/DL
RBC # BLD AUTO: 4.16 M/UL (ref 4.2–5.3)
RBC #/AREA URNS HPF: NEGATIVE /HPF (ref 0–5)
SODIUM SERPL-SCNC: 142 MMOL/L (ref 136–145)
SP GR UR REFRACTOMETRY: 1.01 (ref 1–1.03)
SPECIMEN EXP DATE BLD: NORMAL
UROBILINOGEN UR QL STRIP.AUTO: 0.2 EU/DL (ref 0.2–1)
WBC # BLD AUTO: 3.4 K/UL (ref 4.6–13.2)
WBC URNS QL MICRO: ABNORMAL /HPF (ref 0–4)

## 2019-01-07 PROCEDURE — 81001 URINALYSIS AUTO W/SCOPE: CPT

## 2019-01-07 PROCEDURE — 85025 COMPLETE CBC W/AUTO DIFF WBC: CPT

## 2019-01-07 PROCEDURE — 86900 BLOOD TYPING SEROLOGIC ABO: CPT

## 2019-01-07 PROCEDURE — 93005 ELECTROCARDIOGRAM TRACING: CPT

## 2019-01-07 PROCEDURE — 80053 COMPREHEN METABOLIC PANEL: CPT

## 2019-01-07 PROCEDURE — 84703 CHORIONIC GONADOTROPIN ASSAY: CPT

## 2019-01-07 PROCEDURE — 36415 COLL VENOUS BLD VENIPUNCTURE: CPT

## 2019-01-07 NOTE — PERIOP NOTES
Ms. Lola Apodaca was here today for her PAT appointment. Health assessment was completed and instructions given regarding NPO status, medications, Hibiclens washes, and removal of all jewelry and/or body piercing. Instructed patient not to remove the red Blood Bank armband that was placed on their arm when the Type and Screen was drawn. Opportunity was given to ask questions and all questions were answered. Understanding of instructions was verbalized.

## 2019-01-08 LAB
ATRIAL RATE: 84 BPM
CALCULATED P AXIS, ECG09: 67 DEGREES
CALCULATED R AXIS, ECG10: 59 DEGREES
CALCULATED T AXIS, ECG11: 59 DEGREES
DIAGNOSIS, 93000: NORMAL
P-R INTERVAL, ECG05: 134 MS
Q-T INTERVAL, ECG07: 384 MS
QRS DURATION, ECG06: 80 MS
QTC CALCULATION (BEZET), ECG08: 453 MS
VENTRICULAR RATE, ECG03: 84 BPM

## 2019-01-09 DIAGNOSIS — N30.00 ACUTE CYSTITIS WITHOUT HEMATURIA: Primary | ICD-10-CM

## 2019-01-09 RX ORDER — NITROFURANTOIN (MACROCRYSTALS) 100 MG/1
100 CAPSULE ORAL 2 TIMES DAILY
Qty: 14 CAP | Refills: 0 | Status: SHIPPED | OUTPATIENT
Start: 2019-01-09 | End: 2019-01-16

## 2019-01-09 NOTE — PROGRESS NOTES
Call made to patient using two identifiers, name and . Informed patient of lab results and also notified her that a prescription was sent to her pharmacy. Patient verbalized understanding an no further questions were asked.

## 2019-01-11 NOTE — H&P
H&P 
 
Patient: David España                    MRN: [id number]              Date: 1/11/2019 Age:  48 y.o.,                          Sex: female                 YOB: 1965 
  
David España is a 48y.o. year-old female, G 5 P 2  whose last normal menstrual period was 2005. She admits to abnormal vaginal bleeding. She had an Endometrial ablation but admits to continual abnormal uterine bleeding secondary to the large uterine fibroids. Her previous diagnoses include symptomatic fibroids, and cholelithiasis. She is planning to have a cholecystectomy scheduled by a general surgeon. She is scheduled for a KAYCE & BSO. Silvia Knapp Her last Pap smear on 4/20/18 was negative. Prior surgical hx includes--Endometrial ablation-2002 [id number]. Laser of the cervix-1995 C/S and IOV-[id number] Left salpingectomy and partial left oophorectomy in 1992 for  
                                            ruptured tubal pregnancy. 
  
CT Results (most recent): 
    
Results from Hospital Encounter encounter on 10/26/18 CT ABD PELV W CONT  
  Narrative EXAM: CT of the abdomen and pelvis 
  INDICATION: 53-year-old patient with left lower quadrant abdominal mass. 
  
COMPARISON: Correlation made with ultrasound 9/1/2018. No prior CT imaging is 
available for review. 
  
TECHNIQUE: Axial CT imaging of the abdomen and pelvis was performed with oral 
and intravenous contrast. Multiplanar reformats were generated. 
  
One or more dose reduction techniques were used on this CT: automated exposure 
control, adjustment of the mAs and/or kVp according to patient's size, and 
iterative reconstruction techniques.  The specific techniques utilized on this CT 
 exam have been documented in the patient's electronic medical record.  
  
_______________ 
  
FINDINGS: 
  
LOWER CHEST: Minor atelectasis is present at the lung bases. No pneumonic 
opacity or pleural effusion. Normal cardiac size without pericardial effusion. 
  
LIVER, BILIARY: Liver is normal. No biliary dilation. The gallbladder contains a 
single calcified gallstone. 
  
PANCREAS: Normal. 
  
SPLEEN: Normal. 
  
ADRENALS: Normal. 
  
KIDNEYS/URETERS/BLADDER: Renal parenchymal enhancement is symmetric. No 
hydronephrosis or nephrolithiasis. No distal ureteral or urinary bladder stone. 
  
PELVIC ORGANS: Markedly enlarged and heterogeneously enhancing uterus 
demonstrated with multiple leiomyomata present. Estimated size of the uterus, to 
include the leiomyomata is approximately 13.2 x 8.9 x 15.1 cm (greatest diameter 
craniocaudal). Dominant fundal fibroid noted to project to the level of the 
umbilicus. 
  
VASCULATURE: Unremarkable 
  
LYMPH NODES: No enlarged lymph nodes. 
  
GASTROINTESTINAL TRACT: No bowel dilation or wall thickening. Normal appendix. 
  
BONES: No acute or aggressive osseous abnormalities identified. Hypertrophy 
transverse process of the right L5 vertebral body present with associated 
assimilation joint. 
  
OTHER: None. 
  
_______________ 
   
  Impression IMPRESSION: 
  
1.  Considerably enlarged and multiple myomatous uterus, likely in keeping with 
the provided history of palpable abdominal mass. The largest fibroid which 
projects from the region of the uterine fundus is present at the level of the 
umbilicus. 
  
2. No abdominal or pelvic adenopathy. 
  
3. Normal caliber small and large intestine, to include a normal appendix. 
  
4. Cholelithiasis.  
   
  
  
Review of Systems: General, Cardiovascular, Respiratory, Gastrointestinal, Genitourinary, Neuropsychiatry, Musculoskeletal. 
 Patient denies any problems associated with these systems except for the above mentioned problems. . 
  
General Examination: She is a well-developed, well-nourished female in no acute distress. HEENT--all within normal limits. Lungs--clear to A&P. Cardiovascular system--normal sinus rhythm, no murmurs or  
            gallop. Abdomen--soft, nontender, and normal active. Pelvic exam--External genitalia and BUS--normal.          
                        Cervix: Normal 
                        Vagina: vaginal discharge normal and physiologic Uterus: enlarged, 16-18 weeks size, irregular, 
                        retroflexed and firm. Adnexa: normal bimanual exam 
  
Impression. ---Symptomatic Large Uterine fibroids and a Symptomatic Cholelithiasis. Plan: --Patient is scheduled for a KAYCE & BSO, and the Cholecystectomy will be  
             scheduled for another time.  
  
Shannan Villafana MD 
December 6, 2018

## 2019-01-14 ENCOUNTER — ANESTHESIA EVENT (OUTPATIENT)
Dept: SURGERY | Age: 54
DRG: 743 | End: 2019-01-14
Payer: COMMERCIAL

## 2019-01-15 ENCOUNTER — ANESTHESIA (OUTPATIENT)
Dept: SURGERY | Age: 54
DRG: 743 | End: 2019-01-15
Payer: COMMERCIAL

## 2019-01-15 ENCOUNTER — HOSPITAL ENCOUNTER (INPATIENT)
Age: 54
LOS: 2 days | Discharge: HOME | DRG: 743 | End: 2019-01-17
Attending: OBSTETRICS & GYNECOLOGY | Admitting: OBSTETRICS & GYNECOLOGY
Payer: COMMERCIAL

## 2019-01-15 DIAGNOSIS — D50.8 OTHER IRON DEFICIENCY ANEMIA: Primary | ICD-10-CM

## 2019-01-15 DIAGNOSIS — G89.18 POST-OP PAIN: ICD-10-CM

## 2019-01-15 PROBLEM — D21.9 FIBROIDS: Status: ACTIVE | Noted: 2019-01-15

## 2019-01-15 LAB — HCG UR QL: NEGATIVE

## 2019-01-15 PROCEDURE — 74011250636 HC RX REV CODE- 250/636: Performed by: NURSE ANESTHETIST, CERTIFIED REGISTERED

## 2019-01-15 PROCEDURE — 77030008683 HC TU ET CUF COVD -A: Performed by: ANESTHESIOLOGY

## 2019-01-15 PROCEDURE — 0UT20ZZ RESECTION OF BILATERAL OVARIES, OPEN APPROACH: ICD-10-PCS | Performed by: OBSTETRICS & GYNECOLOGY

## 2019-01-15 PROCEDURE — 0UTC0ZZ RESECTION OF CERVIX, OPEN APPROACH: ICD-10-PCS | Performed by: OBSTETRICS & GYNECOLOGY

## 2019-01-15 PROCEDURE — 76010000132 HC OR TIME 2.5 TO 3 HR: Performed by: OBSTETRICS & GYNECOLOGY

## 2019-01-15 PROCEDURE — 77030031139 HC SUT VCRL2 J&J -A: Performed by: OBSTETRICS & GYNECOLOGY

## 2019-01-15 PROCEDURE — 77030011264 HC ELECTRD BLD EXT COVD -A: Performed by: OBSTETRICS & GYNECOLOGY

## 2019-01-15 PROCEDURE — 77030020782 HC GWN BAIR PAWS FLX 3M -B: Performed by: OBSTETRICS & GYNECOLOGY

## 2019-01-15 PROCEDURE — 76210000000 HC OR PH I REC 2 TO 2.5 HR: Performed by: OBSTETRICS & GYNECOLOGY

## 2019-01-15 PROCEDURE — 77030003028 HC SUT VCRL J&J -A: Performed by: OBSTETRICS & GYNECOLOGY

## 2019-01-15 PROCEDURE — 77030018836 HC SOL IRR NACL ICUM -A: Performed by: OBSTETRICS & GYNECOLOGY

## 2019-01-15 PROCEDURE — 77030002933 HC SUT MCRYL J&J -A: Performed by: OBSTETRICS & GYNECOLOGY

## 2019-01-15 PROCEDURE — 65270000029 HC RM PRIVATE

## 2019-01-15 PROCEDURE — 74011250636 HC RX REV CODE- 250/636: Performed by: OBSTETRICS & GYNECOLOGY

## 2019-01-15 PROCEDURE — 0UT90ZZ RESECTION OF UTERUS, OPEN APPROACH: ICD-10-PCS | Performed by: OBSTETRICS & GYNECOLOGY

## 2019-01-15 PROCEDURE — P9045 ALBUMIN (HUMAN), 5%, 250 ML: HCPCS

## 2019-01-15 PROCEDURE — 74011000250 HC RX REV CODE- 250

## 2019-01-15 PROCEDURE — 88307 TISSUE EXAM BY PATHOLOGIST: CPT

## 2019-01-15 PROCEDURE — 74011000250 HC RX REV CODE- 250: Performed by: OBSTETRICS & GYNECOLOGY

## 2019-01-15 PROCEDURE — 0UT70ZZ RESECTION OF BILATERAL FALLOPIAN TUBES, OPEN APPROACH: ICD-10-PCS | Performed by: OBSTETRICS & GYNECOLOGY

## 2019-01-15 PROCEDURE — 77030026438 HC STYL ET INTUB CARD -A: Performed by: ANESTHESIOLOGY

## 2019-01-15 PROCEDURE — 74011250636 HC RX REV CODE- 250/636

## 2019-01-15 PROCEDURE — 74011250637 HC RX REV CODE- 250/637: Performed by: NURSE ANESTHETIST, CERTIFIED REGISTERED

## 2019-01-15 PROCEDURE — 77030034850: Performed by: OBSTETRICS & GYNECOLOGY

## 2019-01-15 PROCEDURE — 77030027138 HC INCENT SPIROMETER -A

## 2019-01-15 PROCEDURE — 77030002888 HC SUT CHRMC J&J -A: Performed by: OBSTETRICS & GYNECOLOGY

## 2019-01-15 PROCEDURE — 81025 URINE PREGNANCY TEST: CPT

## 2019-01-15 PROCEDURE — 77030002974 HC SUT PLN J&J -A: Performed by: OBSTETRICS & GYNECOLOGY

## 2019-01-15 PROCEDURE — 76060000036 HC ANESTHESIA 2.5 TO 3 HR: Performed by: OBSTETRICS & GYNECOLOGY

## 2019-01-15 RX ORDER — ONDANSETRON 2 MG/ML
4 INJECTION INTRAMUSCULAR; INTRAVENOUS
Status: DISCONTINUED | OUTPATIENT
Start: 2019-01-15 | End: 2019-01-17 | Stop reason: HOSPADM

## 2019-01-15 RX ORDER — SODIUM CHLORIDE 0.9 % (FLUSH) 0.9 %
5-40 SYRINGE (ML) INJECTION EVERY 8 HOURS
Status: DISCONTINUED | OUTPATIENT
Start: 2019-01-15 | End: 2019-01-17 | Stop reason: HOSPADM

## 2019-01-15 RX ORDER — ROCURONIUM BROMIDE 10 MG/ML
INJECTION, SOLUTION INTRAVENOUS AS NEEDED
Status: DISCONTINUED | OUTPATIENT
Start: 2019-01-15 | End: 2019-01-15 | Stop reason: HOSPADM

## 2019-01-15 RX ORDER — NEOMYCIN AND POLYMYXIN B SULFATES 40; 200000 MG/ML; [USP'U]/ML
SOLUTION IRRIGATION AS NEEDED
Status: DISCONTINUED | OUTPATIENT
Start: 2019-01-15 | End: 2019-01-15 | Stop reason: HOSPADM

## 2019-01-15 RX ORDER — SODIUM CHLORIDE 0.9 % (FLUSH) 0.9 %
5-40 SYRINGE (ML) INJECTION AS NEEDED
Status: DISCONTINUED | OUTPATIENT
Start: 2019-01-15 | End: 2019-01-17 | Stop reason: HOSPADM

## 2019-01-15 RX ORDER — NEOSTIGMINE METHYLSULFATE 5 MG/5 ML
SYRINGE (ML) INTRAVENOUS AS NEEDED
Status: DISCONTINUED | OUTPATIENT
Start: 2019-01-15 | End: 2019-01-15 | Stop reason: HOSPADM

## 2019-01-15 RX ORDER — LIDOCAINE HYDROCHLORIDE 10 MG/ML
0.1 INJECTION, SOLUTION EPIDURAL; INFILTRATION; INTRACAUDAL; PERINEURAL AS NEEDED
Status: DISCONTINUED | OUTPATIENT
Start: 2019-01-15 | End: 2019-01-15 | Stop reason: HOSPADM

## 2019-01-15 RX ORDER — NALOXONE HYDROCHLORIDE 0.4 MG/ML
0.1 INJECTION, SOLUTION INTRAMUSCULAR; INTRAVENOUS; SUBCUTANEOUS ONCE
Status: DISCONTINUED | OUTPATIENT
Start: 2019-01-15 | End: 2019-01-15 | Stop reason: HOSPADM

## 2019-01-15 RX ORDER — MIDAZOLAM HYDROCHLORIDE 1 MG/ML
INJECTION, SOLUTION INTRAMUSCULAR; INTRAVENOUS AS NEEDED
Status: DISCONTINUED | OUTPATIENT
Start: 2019-01-15 | End: 2019-01-15 | Stop reason: HOSPADM

## 2019-01-15 RX ORDER — SUCCINYLCHOLINE CHLORIDE 20 MG/ML
INJECTION INTRAMUSCULAR; INTRAVENOUS AS NEEDED
Status: DISCONTINUED | OUTPATIENT
Start: 2019-01-15 | End: 2019-01-15 | Stop reason: HOSPADM

## 2019-01-15 RX ORDER — HYDROMORPHONE HYDROCHLORIDE 2 MG/ML
0.5 INJECTION, SOLUTION INTRAMUSCULAR; INTRAVENOUS; SUBCUTANEOUS AS NEEDED
Status: DISCONTINUED | OUTPATIENT
Start: 2019-01-15 | End: 2019-01-15 | Stop reason: HOSPADM

## 2019-01-15 RX ORDER — DEXAMETHASONE SODIUM PHOSPHATE 4 MG/ML
INJECTION, SOLUTION INTRA-ARTICULAR; INTRALESIONAL; INTRAMUSCULAR; INTRAVENOUS; SOFT TISSUE AS NEEDED
Status: DISCONTINUED | OUTPATIENT
Start: 2019-01-15 | End: 2019-01-15 | Stop reason: HOSPADM

## 2019-01-15 RX ORDER — INSULIN LISPRO 100 [IU]/ML
INJECTION, SOLUTION INTRAVENOUS; SUBCUTANEOUS ONCE
Status: DISCONTINUED | OUTPATIENT
Start: 2019-01-15 | End: 2019-01-15 | Stop reason: HOSPADM

## 2019-01-15 RX ORDER — LIDOCAINE HYDROCHLORIDE 20 MG/ML
INJECTION, SOLUTION EPIDURAL; INFILTRATION; INTRACAUDAL; PERINEURAL AS NEEDED
Status: DISCONTINUED | OUTPATIENT
Start: 2019-01-15 | End: 2019-01-15 | Stop reason: HOSPADM

## 2019-01-15 RX ORDER — EPHEDRINE SULFATE 50 MG/ML
INJECTION, SOLUTION INTRAVENOUS AS NEEDED
Status: DISCONTINUED | OUTPATIENT
Start: 2019-01-15 | End: 2019-01-15 | Stop reason: HOSPADM

## 2019-01-15 RX ORDER — ONDANSETRON 2 MG/ML
4 INJECTION INTRAMUSCULAR; INTRAVENOUS ONCE
Status: COMPLETED | OUTPATIENT
Start: 2019-01-15 | End: 2019-01-15

## 2019-01-15 RX ORDER — FENTANYL CITRATE 50 UG/ML
INJECTION, SOLUTION INTRAMUSCULAR; INTRAVENOUS AS NEEDED
Status: DISCONTINUED | OUTPATIENT
Start: 2019-01-15 | End: 2019-01-15 | Stop reason: HOSPADM

## 2019-01-15 RX ORDER — SODIUM CHLORIDE, SODIUM LACTATE, POTASSIUM CHLORIDE, CALCIUM CHLORIDE 600; 310; 30; 20 MG/100ML; MG/100ML; MG/100ML; MG/100ML
75 INJECTION, SOLUTION INTRAVENOUS CONTINUOUS
Status: DISCONTINUED | OUTPATIENT
Start: 2019-01-15 | End: 2019-01-15 | Stop reason: HOSPADM

## 2019-01-15 RX ORDER — FAMOTIDINE 20 MG/1
20 TABLET, FILM COATED ORAL ONCE
Status: COMPLETED | OUTPATIENT
Start: 2019-01-15 | End: 2019-01-15

## 2019-01-15 RX ORDER — SODIUM CHLORIDE 0.9 % (FLUSH) 0.9 %
5-40 SYRINGE (ML) INJECTION AS NEEDED
Status: DISCONTINUED | OUTPATIENT
Start: 2019-01-15 | End: 2019-01-15 | Stop reason: HOSPADM

## 2019-01-15 RX ORDER — ALBUMIN HUMAN 50 G/1000ML
SOLUTION INTRAVENOUS AS NEEDED
Status: DISCONTINUED | OUTPATIENT
Start: 2019-01-15 | End: 2019-01-15 | Stop reason: HOSPADM

## 2019-01-15 RX ORDER — SODIUM CHLORIDE, SODIUM LACTATE, POTASSIUM CHLORIDE, CALCIUM CHLORIDE 600; 310; 30; 20 MG/100ML; MG/100ML; MG/100ML; MG/100ML
125 INJECTION, SOLUTION INTRAVENOUS CONTINUOUS
Status: DISCONTINUED | OUTPATIENT
Start: 2019-01-15 | End: 2019-01-17 | Stop reason: HOSPADM

## 2019-01-15 RX ORDER — CEFAZOLIN SODIUM 2 G/50ML
2 SOLUTION INTRAVENOUS ONCE
Status: COMPLETED | OUTPATIENT
Start: 2019-01-15 | End: 2019-01-15

## 2019-01-15 RX ORDER — ONDANSETRON 2 MG/ML
INJECTION INTRAMUSCULAR; INTRAVENOUS AS NEEDED
Status: DISCONTINUED | OUTPATIENT
Start: 2019-01-15 | End: 2019-01-15 | Stop reason: HOSPADM

## 2019-01-15 RX ORDER — SODIUM CHLORIDE 0.9 % (FLUSH) 0.9 %
5-40 SYRINGE (ML) INJECTION EVERY 8 HOURS
Status: DISCONTINUED | OUTPATIENT
Start: 2019-01-15 | End: 2019-01-15 | Stop reason: HOSPADM

## 2019-01-15 RX ORDER — GLYCOPYRROLATE 0.2 MG/ML
INJECTION INTRAMUSCULAR; INTRAVENOUS AS NEEDED
Status: DISCONTINUED | OUTPATIENT
Start: 2019-01-15 | End: 2019-01-15 | Stop reason: HOSPADM

## 2019-01-15 RX ORDER — PROPOFOL 10 MG/ML
INJECTION, EMULSION INTRAVENOUS AS NEEDED
Status: DISCONTINUED | OUTPATIENT
Start: 2019-01-15 | End: 2019-01-15 | Stop reason: HOSPADM

## 2019-01-15 RX ADMIN — ROCURONIUM BROMIDE 35 MG: 10 INJECTION, SOLUTION INTRAVENOUS at 08:05

## 2019-01-15 RX ADMIN — DEXAMETHASONE SODIUM PHOSPHATE 4 MG: 4 INJECTION, SOLUTION INTRA-ARTICULAR; INTRALESIONAL; INTRAMUSCULAR; INTRAVENOUS; SOFT TISSUE at 08:15

## 2019-01-15 RX ADMIN — Medication 3 MG: at 10:23

## 2019-01-15 RX ADMIN — FENTANYL CITRATE 100 MCG: 50 INJECTION, SOLUTION INTRAMUSCULAR; INTRAVENOUS at 07:56

## 2019-01-15 RX ADMIN — ROCURONIUM BROMIDE 10 MG: 10 INJECTION, SOLUTION INTRAVENOUS at 09:28

## 2019-01-15 RX ADMIN — ROCURONIUM BROMIDE 5 MG: 10 INJECTION, SOLUTION INTRAVENOUS at 07:56

## 2019-01-15 RX ADMIN — MIDAZOLAM HYDROCHLORIDE 2 MG: 1 INJECTION, SOLUTION INTRAMUSCULAR; INTRAVENOUS at 07:49

## 2019-01-15 RX ADMIN — CEFAZOLIN SODIUM 2 G: 2 SOLUTION INTRAVENOUS at 07:51

## 2019-01-15 RX ADMIN — SODIUM CHLORIDE, SODIUM LACTATE, POTASSIUM CHLORIDE, AND CALCIUM CHLORIDE: 600; 310; 30; 20 INJECTION, SOLUTION INTRAVENOUS at 08:29

## 2019-01-15 RX ADMIN — SODIUM CHLORIDE, SODIUM LACTATE, POTASSIUM CHLORIDE, AND CALCIUM CHLORIDE 75 ML/HR: 600; 310; 30; 20 INJECTION, SOLUTION INTRAVENOUS at 06:44

## 2019-01-15 RX ADMIN — SODIUM CHLORIDE, SODIUM LACTATE, POTASSIUM CHLORIDE, AND CALCIUM CHLORIDE 125 ML/HR: 600; 310; 30; 20 INJECTION, SOLUTION INTRAVENOUS at 11:46

## 2019-01-15 RX ADMIN — LIDOCAINE HYDROCHLORIDE 40 MG: 20 INJECTION, SOLUTION EPIDURAL; INFILTRATION; INTRACAUDAL; PERINEURAL at 07:56

## 2019-01-15 RX ADMIN — ONDANSETRON HYDROCHLORIDE 4 MG: 2 INJECTION, SOLUTION INTRAMUSCULAR; INTRAVENOUS at 14:14

## 2019-01-15 RX ADMIN — GLYCOPYRROLATE 0.6 MG: 0.2 INJECTION INTRAMUSCULAR; INTRAVENOUS at 10:23

## 2019-01-15 RX ADMIN — FENTANYL CITRATE 50 MCG: 50 INJECTION, SOLUTION INTRAMUSCULAR; INTRAVENOUS at 10:29

## 2019-01-15 RX ADMIN — SUCCINYLCHOLINE CHLORIDE 100 MG: 20 INJECTION INTRAMUSCULAR; INTRAVENOUS at 07:56

## 2019-01-15 RX ADMIN — FAMOTIDINE 20 MG: 20 TABLET ORAL at 06:45

## 2019-01-15 RX ADMIN — EPHEDRINE SULFATE 5 MG: 50 INJECTION, SOLUTION INTRAVENOUS at 08:29

## 2019-01-15 RX ADMIN — ALBUMIN HUMAN 250 ML: 50 SOLUTION INTRAVENOUS at 08:32

## 2019-01-15 RX ADMIN — PROPOFOL 160 MG: 10 INJECTION, EMULSION INTRAVENOUS at 07:56

## 2019-01-15 RX ADMIN — ONDANSETRON HYDROCHLORIDE 4 MG: 2 SOLUTION INTRAMUSCULAR; INTRAVENOUS at 17:52

## 2019-01-15 RX ADMIN — HYDROMORPHONE HYDROCHLORIDE 0.5 MG: 2 INJECTION INTRAMUSCULAR; INTRAVENOUS; SUBCUTANEOUS at 11:27

## 2019-01-15 RX ADMIN — HYDROMORPHONE HYDROCHLORIDE: 10 INJECTION, SOLUTION INTRAMUSCULAR; INTRAVENOUS; SUBCUTANEOUS at 11:41

## 2019-01-15 RX ADMIN — HYDROMORPHONE HYDROCHLORIDE 0.5 MG: 2 INJECTION INTRAMUSCULAR; INTRAVENOUS; SUBCUTANEOUS at 11:37

## 2019-01-15 RX ADMIN — ONDANSETRON 4 MG: 2 INJECTION INTRAMUSCULAR; INTRAVENOUS at 10:10

## 2019-01-15 NOTE — ANESTHESIA POSTPROCEDURE EVALUATION
Procedure(s): 
TOTAL ABDOMINAL HYSTERECTOMY/BILATERAL SALPINGO OOPHORECTOMY. Anesthesia Post Evaluation Multimodal analgesia: multimodal analgesia used between 6 hours prior to anesthesia start to PACU discharge Patient location during evaluation: bedside Patient participation: complete - patient participated Level of consciousness: awake Pain management: adequate Airway patency: patent Anesthetic complications: no 
Cardiovascular status: stable Respiratory status: acceptable Hydration status: acceptable Post anesthesia nausea and vomiting:  controlled Visit Vitals /70 (BP Patient Position: At rest) Pulse 100 Temp 36.7 °C (98 °F) Resp 16 Ht 5' 1\" (1.549 m) Wt 53.1 kg (117 lb) SpO2 100% BMI 22.11 kg/m²

## 2019-01-15 NOTE — PERIOP NOTES
TRANSFER - OUT REPORT: 
 
Verbal report given to Joanne RN(name) on [facility]  being transferred to [address] [place] [address](unit) for routine post - op Report consisted of patients Situation, Background, Assessment and  
Recommendations(SBAR). Information from the following report(s) SBAR, OR Summary, Intake/Output and MAR was reviewed with the receiving nurse. Lines:  
Peripheral IV 01/15/19 Anterior; Left Forearm (Active) Site Assessment Clean, dry, & intact 1/15/2019 10:45 AM  
Phlebitis Assessment 0 1/15/2019 10:45 AM  
Infiltration Assessment 0 1/15/2019 10:45 AM  
Dressing Status Clean, dry, & intact 1/15/2019 10:45 AM  
Dressing Type Transparent 1/15/2019 10:45 AM  
Hub Color/Line Status Pink; Infusing;Patent 1/15/2019 10:45 AM  
  
 
Opportunity for questions and clarification was provided. Patient transported with: 
 Patient's medications from home Tech

## 2019-01-15 NOTE — INTERVAL H&P NOTE
H&P Update: 
Estuardo Leslie was seen and examined. History and physical has been reviewed. The patient has been examined.  There have been no significant clinical changes since the completion of the originally dated History and Physical. 
 
Signed By: Carlos Garcia MD   
 January 15, 2019 7:45 AM

## 2019-01-15 NOTE — ANESTHESIA PREPROCEDURE EVALUATION
Anesthetic History No history of anesthetic complications Review of Systems / Medical History Patient summary reviewed and pertinent labs reviewed Pulmonary Within defined limits Neuro/Psych Cardiovascular GI/Hepatic/Renal 
  
GERD Endo/Other Anemia Other Findings Physical Exam 
 
Airway Mallampati: II 
TM Distance: 4 - 6 cm Neck ROM: normal range of motion Mouth opening: Normal 
 
 Cardiovascular Rhythm: regular Rate: normal 
 
 
 
 Dental 
 
Dentition: Poor dentition Pulmonary Breath sounds clear to auscultation Abdominal 
GI exam deferred Other Findings Anesthetic Plan ASA: 2 Anesthesia type: general 
 
 
 
 
Induction: Intravenous Anesthetic plan and risks discussed with: Patient

## 2019-01-15 NOTE — OP NOTES
Operative Note    Name: Jeff BREWSTER [place]   Medical Record Number: [id number]   YOB: 1965    Preoperative Diagnosis: D25.9 SYMPTOMATIC LARGE UTERINE FIBROIDS    Postoperative Diagnosis: D25.9 SYMPTOMATIC LARGE UTERINE FIBROIDS    Procedure: Procedure(s):  TOTAL ABDOMINAL HYSTERECTOMY/BILATERAL SALPINGO OOPHORECTOMY    Surgeon:  Kareem Deutsch MD     Assistant:  Aden Romo MD    Anesthesia: General    Findings: fibroid uterus, enlarged uterus, normal BSO and adhesion weight of > 250 gm    Estimated Blood Loss:  200  cc    Drains: Sood    Pathology /Specimens:    ID Type Source Tests Collected by Time Destination   1 : uterus, bilateral fallopian tubes, bilateral ovaries, cervix Preservative Uterus  Monica Hsieh MD 1/15/2019 0920 Pathology       DVT Prophylaxis: SCD Hose    DESCRIPTION OF PROCEDURE: The patient was placed on the operating room table in the supine position and placed under general endotracheal anesthesia. Time out was done to confirm the operating procedure, surgeon, patient and site. Once confirmed by the team, procedure was started. The patient was prepped and draped in the usual fashion for abdominal surgery. Pfannenstiel incision was made and was carried down sharply through the skin, subcutaneous tissues, and fascia. The fascia was then bluntly and sharply dissected free from the underlying rectus muscles. The peritoneum was sharply entered and extended vertically. The bowel was packed out of the field with three lap squares. Large pedunculated fibroids were seen on each of the uterine fundus. The uterine cornua were grasped with Apryl clamps. The round ligaments bilaterally were coagulated and ligated. The anterior leaf of the broad ligament was then incised down on both sides and across the lower uterine segment. The bladder flap was bluntly and sharply developed. An avascular window of the posterior leaf of the broad ligament was then penetrated.   The infundibulopelvic ligaments were clamped, cut, and sutured bilaterally with adequate hemostasis. The uterine vessels were next skeletonized, bilaterally clamped with curved Heney clamps, and incised. Then each was doubly ligated with 2 single stick ties of 0 Vicryl. Pedicles were then developed down to the cardinal ligaments on each side, each pedicle being clamped with a straight Heney clamp, incised with the scalpel, and Sal suture ligated with 0 Vicryl. Upon reaching the vaginal cuff, the cuff was cross-clamped with curved Heney clamps, incised with Corrie scissors, and the uterus, tubes, and ovaries were removed from the operative field. The vaginal angles were sewn with figure of 8 stitches of 0 Vicryl on each side. Then, the remainder of the cuff was closed with figure-of-eight stitches of 0 Vicryl. Hemostasis was ensured. The pelvis was irrigated and suctioned clean. The lap squares were removed as well as the retractor and all other instruments. Counts were correct. The abdomen was closed with 2-0 vicryl on the rectus muscles and 0 Vicryl on the fascia. The skin was reapproximated with 4-0 vicryl in a running fashion. Honeycomb dressing was applied. The patient tolerated the procedure well and went to the recovery room in satisfactory condition.

## 2019-01-15 NOTE — ROUTINE PROCESS
1205 TRANSFER - IN REPORT: 
 
Verbal report received from [address] [place] [address]  RN(name) on [employer]  being received from PACU(unit) for routine progression of care Report consisted of patients Situation, Background, Assessment and  
Recommendations(SBAR). Information from the following report(s) SBAR was reviewed with the receiving nurse. Opportunity for questions and clarification was provided. Assessment completed upon patients arrival to unit and care assumed. 1255 pt arrived. C02 monitoring device attached to PCA pump. Initial assessment completed. 1400 C? O nausea. Gave Zofran 
 
1800 Call Dr. Qamar Fair for one time zofran 
1900 Bedside and Verbal shift change report given to [facility] RN (oncoming nurse) by Erika Garza RN (offgoing nurse). Report included the following information SBAR.

## 2019-01-16 LAB
ANION GAP SERPL CALC-SCNC: 6 MMOL/L (ref 3–18)
BASOPHILS # BLD: 0 K/UL (ref 0–0.1)
BASOPHILS NFR BLD: 0 % (ref 0–2)
BUN SERPL-MCNC: 7 MG/DL (ref 7–18)
BUN/CREAT SERPL: 11 (ref 12–20)
CALCIUM SERPL-MCNC: 7.9 MG/DL (ref 8.5–10.1)
CHLORIDE SERPL-SCNC: 109 MMOL/L (ref 100–108)
CO2 SERPL-SCNC: 26 MMOL/L (ref 21–32)
CREAT SERPL-MCNC: 0.63 MG/DL (ref 0.6–1.3)
DIFFERENTIAL METHOD BLD: ABNORMAL
EOSINOPHIL # BLD: 0 K/UL (ref 0–0.4)
EOSINOPHIL NFR BLD: 0 % (ref 0–5)
ERYTHROCYTE [DISTWIDTH] IN BLOOD BY AUTOMATED COUNT: 12.5 % (ref 11.6–14.5)
GLUCOSE SERPL-MCNC: 77 MG/DL (ref 74–99)
HCT VFR BLD AUTO: 29 % (ref 35–45)
HGB BLD-MCNC: 9.4 G/DL (ref 12–16)
LYMPHOCYTES # BLD: 1.3 K/UL (ref 0.9–3.6)
LYMPHOCYTES NFR BLD: 14 % (ref 21–52)
MCH RBC QN AUTO: 28.3 PG (ref 24–34)
MCHC RBC AUTO-ENTMCNC: 32.4 G/DL (ref 31–37)
MCV RBC AUTO: 87.3 FL (ref 74–97)
MONOCYTES # BLD: 0.9 K/UL (ref 0.05–1.2)
MONOCYTES NFR BLD: 10 % (ref 3–10)
NEUTS SEG # BLD: 6.7 K/UL (ref 1.8–8)
NEUTS SEG NFR BLD: 76 % (ref 40–73)
PLATELET # BLD AUTO: 138 K/UL (ref 135–420)
PMV BLD AUTO: 9.9 FL (ref 9.2–11.8)
POTASSIUM SERPL-SCNC: 4.1 MMOL/L (ref 3.5–5.5)
RBC # BLD AUTO: 3.32 M/UL (ref 4.2–5.3)
SODIUM SERPL-SCNC: 141 MMOL/L (ref 136–145)
WBC # BLD AUTO: 8.9 K/UL (ref 4.6–13.2)

## 2019-01-16 PROCEDURE — 74011250636 HC RX REV CODE- 250/636: Performed by: OBSTETRICS & GYNECOLOGY

## 2019-01-16 PROCEDURE — 80048 BASIC METABOLIC PNL TOTAL CA: CPT

## 2019-01-16 PROCEDURE — 74011000250 HC RX REV CODE- 250: Performed by: OBSTETRICS & GYNECOLOGY

## 2019-01-16 PROCEDURE — 65270000029 HC RM PRIVATE

## 2019-01-16 PROCEDURE — 36415 COLL VENOUS BLD VENIPUNCTURE: CPT

## 2019-01-16 PROCEDURE — 85025 COMPLETE CBC W/AUTO DIFF WBC: CPT

## 2019-01-16 PROCEDURE — 74011250637 HC RX REV CODE- 250/637: Performed by: OBSTETRICS & GYNECOLOGY

## 2019-01-16 RX ORDER — OXYCODONE AND ACETAMINOPHEN 5; 325 MG/1; MG/1
1 TABLET ORAL
Qty: 12 TAB | Refills: 0 | Status: SHIPPED | OUTPATIENT
Start: 2019-01-16 | End: 2019-03-14 | Stop reason: ALTCHOICE

## 2019-01-16 RX ORDER — LATANOPROST 50 UG/ML
1 SOLUTION/ DROPS OPHTHALMIC
Status: DISCONTINUED | OUTPATIENT
Start: 2019-01-16 | End: 2019-01-17 | Stop reason: HOSPADM

## 2019-01-16 RX ORDER — FACIAL-BODY WIPES
20 EACH TOPICAL ONCE
Status: COMPLETED | OUTPATIENT
Start: 2019-01-16 | End: 2019-01-16

## 2019-01-16 RX ORDER — POLYETHYLENE GLYCOL 3350 17 G/17G
17 POWDER, FOR SOLUTION ORAL DAILY
Status: DISCONTINUED | OUTPATIENT
Start: 2019-01-17 | End: 2019-01-17 | Stop reason: HOSPADM

## 2019-01-16 RX ORDER — LANOLIN ALCOHOL/MO/W.PET/CERES
1 CREAM (GRAM) TOPICAL 2 TIMES DAILY WITH MEALS
Status: DISCONTINUED | OUTPATIENT
Start: 2019-01-16 | End: 2019-01-17 | Stop reason: HOSPADM

## 2019-01-16 RX ORDER — OXYCODONE AND ACETAMINOPHEN 5; 325 MG/1; MG/1
1 TABLET ORAL
Status: DISCONTINUED | OUTPATIENT
Start: 2019-01-16 | End: 2019-01-17 | Stop reason: HOSPADM

## 2019-01-16 RX ORDER — LANOLIN ALCOHOL/MO/W.PET/CERES
325 CREAM (GRAM) TOPICAL 2 TIMES DAILY WITH MEALS
Qty: 60 TAB | Refills: 1 | Status: SHIPPED | OUTPATIENT
Start: 2019-01-16 | End: 2019-03-14 | Stop reason: ALTCHOICE

## 2019-01-16 RX ORDER — OXYCODONE AND ACETAMINOPHEN 5; 325 MG/1; MG/1
2 TABLET ORAL
Status: DISCONTINUED | OUTPATIENT
Start: 2019-01-16 | End: 2019-01-17 | Stop reason: HOSPADM

## 2019-01-16 RX ADMIN — Medication 10 ML: at 22:00

## 2019-01-16 RX ADMIN — Medication 20 MG: at 09:10

## 2019-01-16 RX ADMIN — LATANOPROST 1 DROP: 50 SOLUTION/ DROPS OPHTHALMIC at 21:00

## 2019-01-16 RX ADMIN — FERROUS SULFATE TAB 325 MG (65 MG ELEMENTAL FE) 325 MG: 325 (65 FE) TAB at 19:56

## 2019-01-16 RX ADMIN — SODIUM CHLORIDE, SODIUM LACTATE, POTASSIUM CHLORIDE, AND CALCIUM CHLORIDE 125 ML/HR: 600; 310; 30; 20 INJECTION, SOLUTION INTRAVENOUS at 11:46

## 2019-01-16 RX ADMIN — OXYCODONE AND ACETAMINOPHEN 1 TABLET: 5; 325 TABLET ORAL at 16:17

## 2019-01-16 RX ADMIN — OXYCODONE AND ACETAMINOPHEN 2 TABLET: 5; 325 TABLET ORAL at 19:57

## 2019-01-16 NOTE — ROUTINE PROCESS
8880 Verbal shift change report given to Bj Edwards RN (oncoming nurse) by Mikey Byrne RN (offgoing nurse). Report included the following information SBAR.  
 
0842 Telephone order Dr Estevez Overcast 2, rectally now. 1400 Up to chair for 20 minutes. PCA d/bonnie. Bowels sounds less active than this morning.

## 2019-01-16 NOTE — ROUTINE PROCESS
1403 Verbal shift change report given to Jaron Atkins RN (oncoming nurse) by Serena Castillo RN (offgoing nurse). Report included the following information SBAR.  
 
0881 Telephone order Dr Baldemar Funez 2, rectally now. 1400 Up to chair for 20 minutes. PCA d/bonnie. Bowels sounds less active than this morning. 1600 Pt up walking to end of hallway. Voided twice without pain. Tolerating apple sauce, juice and jennifer crackers. 1900  Bedside and Verbal shift change report given to *** (oncoming nurse) by Jaron Atkins RN (offgoing nurse). Report included the following information SBAR.

## 2019-01-16 NOTE — ROUTINE PROCESS
Bedside and Verbal shift change report given to [place] (oncoming nurse) by [place]. Rohit Peres (offgoing nurse). Report included the following information SBAR, Kardex, Procedure Summary, Recent Results and Med Rec Status.

## 2019-01-16 NOTE — PROGRESS NOTES
Gynecology Progress Note Patient doing well post-op day 1 from Procedure(s): 
TOTAL ABDOMINAL HYSTERECTOMY/BILATERAL SALPINGO OOPHORECTOMY without significant complaints. Pain controlled on current medication. Voiding without difficulty. Patient is passing flatus. Vitals:  Blood pressure 112/75, pulse 80, temperature 98.3 °F (36.8 °C), resp. rate 18, height 5' 1\" (1.549 m), weight 117 lb (53.1 kg), last menstrual period 12/15/2018, SpO2 99 %. Temp (24hrs), Av.4 °F (36.9 °C), Min:97.7 °F (36.5 °C), Max:98.8 °F (37.1 °C) Exam:  Patient without distress. Abdomen soft,  nontender. Incision dry and clean without erythema. Lower extremities are negative for swelling, cords, or tenderness. Lab/Data Review: CBC:  
Lab Results Component Value Date/Time WBC 8.9 2019 06:44 AM  
 HGB 9.4 (L) 2019 06:44 AM  
 HCT 29.0 (L) 2019 06:44 AM  
  2019 06:44 AM  
 
 
Assessment and Plan:  Patient appears to be having uncomplicated post Procedure(s): 
TOTAL ABDOMINAL HYSTERECTOMY/BILATERAL SALPINGO OOPHORECTOMY course. Continue routine post-op care.

## 2019-01-17 VITALS
BODY MASS INDEX: 22.09 KG/M2 | HEART RATE: 86 BPM | SYSTOLIC BLOOD PRESSURE: 98 MMHG | OXYGEN SATURATION: 99 % | HEIGHT: 61 IN | RESPIRATION RATE: 14 BRPM | DIASTOLIC BLOOD PRESSURE: 64 MMHG | WEIGHT: 117 LBS | TEMPERATURE: 99.2 F

## 2019-01-17 PROCEDURE — 74011250637 HC RX REV CODE- 250/637: Performed by: OBSTETRICS & GYNECOLOGY

## 2019-01-17 PROCEDURE — 74011000250 HC RX REV CODE- 250: Performed by: OBSTETRICS & GYNECOLOGY

## 2019-01-17 RX ADMIN — FERROUS SULFATE TAB 325 MG (65 MG ELEMENTAL FE) 325 MG: 325 (65 FE) TAB at 09:08

## 2019-01-17 RX ADMIN — OXYCODONE AND ACETAMINOPHEN 2 TABLET: 5; 325 TABLET ORAL at 03:41

## 2019-01-17 RX ADMIN — POLYETHYLENE GLYCOL 3350 17 G: 17 POWDER, FOR SOLUTION ORAL at 09:08

## 2019-01-17 RX ADMIN — OXYCODONE AND ACETAMINOPHEN 2 TABLET: 5; 325 TABLET ORAL at 11:23

## 2019-01-17 RX ADMIN — Medication 10 ML: at 05:45

## 2019-01-17 NOTE — ROUTINE PROCESS
Bedside and Verbal shift change report given to Lily Steele RN (oncoming nurse) by Yuliet Roberson RN  (offgoing nurse). Report included the following information OR Summary, MAR and Recent Results. 0730 - Agustín Diaz RN assumes care of pt

## 2019-01-17 NOTE — PROGRESS NOTES
conducted an initial consultation and Spiritual Assessment for [facility], who is a 48 y.o.,female. Patients Primary Language is: [place]. According to the patients EMR [employer]aritan Affiliation is: Non Restorationism.  
 
The reason the Patient came to the hospital is:  
Patient Active Problem List  
 Diagnosis Date Noted  Fibroids 01/15/2019  Prediabetes 03/21/2018  Trapezius muscle spasm 03/20/2018  Gastroesophageal reflux disease 03/20/2018  Vitamin D insufficiency 11/28/2017 The  provided the following Interventions: 
Initiated a relationship of care and support. Explored issues of niya, belief, spirituality and Mosque/ritual needs while hospitalized. Listened empathically. Provided chaplaincy education. Provided information about Spiritual Care Services. Offered prayer and assurance of continued prayers on patient's behalf. Chart reviewed. The following outcomes where achieved: 
Patient disclosed that her uterine mass was an accidental finding because she saw her doctor for gallstone and found out that she had a uterine mass that had to be taken cared of as well. Patient shared limited information about both her medical narrative and spiritual journey/beliefs.  confirmed Patient's [employer]aritan Affiliation. Patient processed feeling about current hospitalization. Patient expressed gratitude for 's visit. Assessment: 
Patient still has some abdominal discomfort whenever she moves and looking forward to have her gallstones taken cared of sooner. Patient and  expressed their beliefs that God is with them through all of these and claim that healing is already taking place. Patient does not have any Mosque/cultural needs that will affect patients preferences in health care. There are no spiritual or Mosque issues which require intervention at this time. Plan: Chaplains will continue to follow and will provide pastoral care on an as needed/requested basis.  recommends bedside caregivers page  on duty if patient shows signs of acute spiritual or emotional distress. Chaplain Resident Misti Gallegos Spiritual Care  
[phone]

## 2019-01-17 NOTE — ACP (ADVANCE CARE PLANNING)
Assisted patient with the execution of Advance Medical Directive. Placed the copy into patient's \"like paper chart. \"  See Flow Sheet for other pastoral interventions. Chaplains will continue to follow and provide pastoral care on an as needed/requested basis.     Chaplain Resident [address] [phone]

## 2019-01-17 NOTE — DISCHARGE SUMMARY
Discharge Summary     Name: Rocael Whitaker MRN: [id number]  SSN: [id number]    YOB: 1965  Age: 48 y.o. Sex: female      Allergies: Aspirin    Admit Date: 1/15/2019    Discharge Date: 1/16/2019      Admitting Physician: Slime Olivares MD     * Admission Diagnoses: Abnormal uterine bleeding, Chronic pelvic pain, Dysmenorrhea and Fibroids    * Discharge Diagnoses:   Hospital Problems as of 1/16/2019 Date Reviewed: 1/16/2019          Codes Class Noted - Resolved POA    Fibroids ICD-10-CM: D21.9  ICD-9-CM: 215.9  1/15/2019 - Present Unknown               * Procedures: Total Abdominal Hysterectomy with Bilateral Salpingo-Oophorectomy    * Discharge Condition: [facility] Course: Normal hospital course for this procedure. Significant Diagnostic Studies:   Recent Results (from the past 24 hour(s))   CBC WITH AUTOMATED DIFF    Collection Time: 01/16/19  6:44 AM   Result Value Ref Range    WBC 8.9 4.6 - 13.2 K/uL    RBC 3.32 (L) 4.20 - 5.30 M/uL    HGB 9.4 (L) 12.0 - 16.0 g/dL    HCT 29.0 (L) 35.0 - 45.0 %    MCV 87.3 74.0 - 97.0 FL    MCH 28.3 24.0 - 34.0 PG    MCHC 32.4 31.0 - 37.0 g/dL    RDW 12.5 11.6 - 14.5 %    PLATELET 977 570 - 052 K/uL    MPV 9.9 9.2 - 11.8 FL    NEUTROPHILS 76 (H) 40 - 73 %    LYMPHOCYTES 14 (L) 21 - 52 %    MONOCYTES 10 3 - 10 %    EOSINOPHILS 0 0 - 5 %    BASOPHILS 0 0 - 2 %    ABS. NEUTROPHILS 6.7 1.8 - 8.0 K/UL    ABS. LYMPHOCYTES 1.3 0.9 - 3.6 K/UL    ABS. MONOCYTES 0.9 0.05 - 1.2 K/UL    ABS. EOSINOPHILS 0.0 0.0 - 0.4 K/UL    ABS.  BASOPHILS 0.0 0.0 - 0.1 K/UL    DF AUTOMATED     METABOLIC PANEL, BASIC    Collection Time: 01/16/19  6:44 AM   Result Value Ref Range    Sodium 141 136 - 145 mmol/L    Potassium 4.1 3.5 - 5.5 mmol/L    Chloride 109 (H) 100 - 108 mmol/L    CO2 26 21 - 32 mmol/L    Anion gap 6 3.0 - 18 mmol/L    Glucose 77 74 - 99 mg/dL    BUN 7 7.0 - 18 MG/DL    Creatinine 0.63 0.6 - 1.3 MG/DL    BUN/Creatinine ratio 11 (L) 12 - 20      GFR est AA >60 >60 ml/min/1.73m2    GFR est non-AA >60 >60 ml/min/1.73m2    Calcium 7.9 (L) 8.5 - 10.1 MG/DL       * Disposition: Home    Discharge Medications:   Current Discharge Medication List      START taking these medications    Details   ferrous sulfate 325 mg (65 mg iron) tablet Take 1 Tab by mouth two (2) times daily (with meals). Qty: 60 Tab, Refills: 1    Associated Diagnoses: Other iron deficiency anemia      oxyCODONE-acetaminophen (PERCOCET) 5-325 mg per tablet Take 1 Tab by mouth every four (4) hours as needed. Max Daily Amount: 6 Tabs. Qty: 12 Tab, Refills: 0    Associated Diagnoses: Post-op pain              * Follow-up Care/Patient Instructions: Activity: No sex, douching, or tampons for 6 weeks or as directed by your physician. No heavy lifting for 6 weeks. No driving while taking pain medication.   Diet: Resume pre-hospital diet  Wound Care: Keep wound clean and dry and As directed    Follow-up Information     Follow up With Specialties Details Why Contact Info    Sophie Trinidad MD [address]   [place] [address]  [phone]             Signed By:  Yogi Soto MD     January 16, 2019

## 2019-01-17 NOTE — DISCHARGE INSTRUCTIONS
GYN Discharge Instructions    Physical Activity  Frequent rest periods  No sex, douches, tampons, deep tub baths, pools or hot tubs for 6 weeks. No heavy lifting, pulling or pushing for 6 weeks  No driving for 2 weeks    Call your doctor if:  You have pain in your calf or legs  You have excessive pain  You have a temperature over 101 degrees  You saturate a sanitary napkin in less than 4 hours  You have any foul smelling drainage  You have frequency, pain or burning when you urinate. Your incision has , drains or bleeds  You have any severe abdominal, back or chest pain. Diet  As tolerated    Follow up with your doctor in 2 weeks      DISCHARGE SUMMARY from Nurse    The following personal items collected during your admission are returned to you:   Dental Appliance: Dental Appliances: None  Vision: Visual Aid: None  Hearing Aid:    Jewelry: Jewelry: None  Clothing: Clothing: Pants, Shirt, Undergarments, Footwear, Socks, Mark, Jacket/Coat(scarf )  Other Valuables: Other Valuables: None  Valuables sent to safe:                *  Please give a list of your current medications to your Primary Care Provider. *  Please update this list whenever your medications are discontinued, doses are      changed, or new medications (including over-the-counter products) are added. *  Please carry medication information at all times in case of emergency situations. These are general instructions for a healthy lifestyle:    No smoking/ No tobacco products/ Avoid exposure to second hand smoke    Surgeon General's Warning:  Quitting smoking now greatly reduces serious risk to your health.     Obesity, smoking, and sedentary lifestyle greatly increases your risk for illness    A healthy diet, regular physical exercise & weight monitoring are important for maintaining a healthy lifestyle    You may be retaining fluid if you have a history of heart failure or if you experience any of the following symptoms:  Weight gain of 3 pounds or more overnight or 5 pounds in a week, increased swelling in our hands or feet or shortness of breath while lying flat in bed. Please call your doctor as soon as you notice any of these symptoms; do not wait until your next office visit. Recognize signs and symptoms of STROKE:    F-face looks uneven    A-arms unable to move or move unevenly    S-speech slurred or non-existent    T-time-call 911 as soon as signs and symptoms begin-DO NOT go       Back to bed or wait to see if you get better-TIME IS BRAIN. The discharge information has been reviewed with the patient. The patient verbalized understanding. Patient armband removed and shreddedPatient Education        Abdominal Hysterectomy: What to Expect at [address] [place] [address] can expect to feel better and stronger each day, although you may need pain medicine for a week or two. You may get tired easily or have less energy than usual. This may last for several weeks after surgery. You will probably notice that your belly is swollen and puffy. This is common. The swelling will take several weeks to go down. It may take about 4 to 6 weeks to fully recover. It is important to avoid lifting while you are recovering so that you can heal.  This care sheet gives you a general idea about how long it will take for you to recover. But each person recovers at a different pace. Follow the steps below to get better as quickly as possible. How can you care for yourself at home? Activity    Rest when you feel tired. Getting enough sleep will help you recover.     Try to walk each day. Start by walking a little more than you did the day before. Bit by bit, increase the amount you walk. Walking boosts blood flow and helps prevent pneumonia and constipation.     Avoid lifting anything that would make you strain.  This may include a child, heavy grocery bags and milk containers, a heavy briefcase or backpack, cat litter or dog food bags, or a vacuum .     Avoid strenuous activities, such as biking, jogging, weight lifting, or aerobic exercise, until your doctor says it is okay.     You may shower. Pat the cut (incision) dry. Do not take a bath for the first 2 weeks, or until your doctor tells you it is okay.     Ask your doctor when you can drive again.     You will probably need to take 2 to 4 weeks off from work. It depends on the type of work you do and how you feel.     Your doctor will tell you when you can have sex again. Diet    You can eat your normal diet. If your stomach is upset, try bland, low-fat foods like plain rice, broiled chicken, toast, and yogurt.     Drink plenty of fluids (unless your doctor tells you not to).   You may notice that your bowel movements are not regular right after your surgery. This is common. Try to avoid constipation and straining with bowel movements. You may want to take a fiber supplement every day. If you have not had a bowel movement after a couple of days, ask your doctor about taking a mild laxative. Medicines    Your doctor will tell you if and when you can restart your medicines. He or she will also give you instructions about taking any new medicines.     If you take blood thinners, such as warfarin (Coumadin), clopidogrel (Plavix), or aspirin, be sure to talk to your doctor. He or she will tell you if and when to start taking those medicines again. Make sure that you understand exactly what your doctor wants you to do.     Be safe with medicines. Take pain medicines exactly as directed. If the doctor gave you a prescription medicine for pain, take it as prescribed. If you are not taking a prescription pain medicine, ask your doctor if you can take an over-the-counter medicine.     If your doctor prescribed antibiotics, take them as directed. Do not stop taking them just because you feel better.  You need to take the full course of antibiotics.     If you think your pain medicine is making you sick to your stomach: Take your medicine after meals (unless your doctor has told you not to). Ask your doctor for a different pain medicine. Incision care    If you have strips of tape on the cut (incision) the doctor made, leave the tape on for a week or until it falls off. Or follow your doctor's instructions for removing the tape.     Wash the area daily with warm, soapy water, and pat it dry. Don't use hydrogen peroxide or alcohol, which can slow healing. You may cover the area with a gauze bandage if it weeps or rubs against clothing. Change the bandage every day.     Keep the area clean and dry. Other instructions    You may have some light vaginal bleeding. Wear sanitary pads if needed. Do not douche or use tampons. Follow-up care is a key part of your treatment and safety. Be sure to make and go to all appointments, and call your doctor if you are having problems. It's also a good idea to know your test results and keep a list of the medicines you take. When should you call for help? Call 911 anytime you think you may need emergency care. For example, call if:    You passed out (lost consciousness).     You have chest pain, are short of breath, or cough up blood.    Call your doctor now or seek immediate medical care if:    You have pain that does not get better after you take pain medicine.     You cannot pass stools or gas.     You have vaginal discharge that has increased in amount or smells bad.     You are sick to your stomach or cannot drink fluids.     You have loose stitches, or your incision comes open.     Bright red blood has soaked through the bandage over your incision.     You have signs of infection, such as: Increased pain, swelling, warmth, or redness. Red streaks leading from the incision. Pus draining from the incision.   A fever.     You have bright red vaginal bleeding that soaks one or more pads in an hour, or you have large clots.     You have signs of a blood clot in your leg (called a deep vein thrombosis), such as:  Pain in your calf, back of the knee, thigh, or groin. Redness and swelling in your leg.    Watch closely for changes in your health, and be sure to contact your doctor if you have any problems. Where can you learn more? Go to http://ryder-mynor.info/. Enter M280 in the search box to learn more about \"Abdominal Hysterectomy: What to Expect at Home. \"  Current as of: May 15, 2018  Content Version: 11.8  © 4528-9897 [employer]. Care instructions adapted under license by [employer] (which disclaims liability or warranty for this information). If you have questions about a medical condition or this instruction, always ask your healthcare professional. [address] any warranty or liability for your use of this information. DelaGet Activation    Thank you for requesting access to DelaGet. Please follow the instructions below to securely access and download your online medical record. DelaGet allows you to send messages to your doctor, view your test results, renew your prescriptions, schedule appointments, and more. How Do I Sign Up? 1. In your internet browser, go to [email or web address]. 2. Click on the First Time User? Click Here link in the Sign In box. You will see the New Member Sign Up page. 3. Enter your DelaGet Access Code exactly as it appears below. You will not need to use this code after youve completed the sign-up process. If you do not sign up before the expiration date, you must request a new code. DelaGet Access Code: Activation code not generated  Current DelaGet Status: Patient Declined (This is the date your [employer] access code will )    4. Enter the last four digits of your Social Security Number (xxxx) and Date of Birth (mm/dd/yyyy) as indicated and click Submit. You will be taken to the next sign-up page. 5. Create a DelaGet ID. This will be your Payward login ID and cannot be changed, so think of one that is secure and easy to remember. 6. Create a Payward password. You can change your password at any time. 7. Enter your Password Reset Question and Answer. This can be used at a later time if you forget your password. 8. Enter your e-mail address. You will receive e-mail notification when new information is available in [address] 9. Click Sign Up. You can now view and download portions of your medical record. 10. Click the Download Summary menu link to download a portable copy of your medical information. Additional Information    If you have questions, please visit the Frequently Asked Questions section of the Payward website at [email or web address] Remember, Payward is NOT to be used for urgent needs. For medical emergencies, dial 911.

## 2019-01-17 NOTE — ROUTINE PROCESS
[id number]: Shift Summary Report 
 
0700: Bedside and Verbal shift change report given to PARISH Tello (oncoming nurse) by NOEYM Wu (offgoing nurse). Report included the following information SBAR.  
 
0720: Introduction to Pt, Discussed care plan, Pt verbalizes understanding of care plan. Safety issues check. Pt denies needs or assistance at this time. 2495: Assessment completed. Pt denies chest pain, SOB, difficulty breathing 
 
0930:  stated that pt requested order for advanced directives. Will call  1002: Pt had loose BM 
 
1010: Dr. Navdeep Alvarez called regarding the pt stool. Pt has had 2 loose BM's. Dr. Navdeep Alvarez will call back. 1016: Dr. Navdeep Alvarez called back stated that the pt could go home and those stools counted. 1005:Mahesh in room discussing advanced directives 1123: D/C teaching completed. Copy of D/C teaching/instructions given to Pt. Pt verbalizes understanding. Pt given opportunity for questions. Pt denies comments/concerns/questions at this time 1258: Pt D/C off the unit with significant other at side

## 2019-01-25 ENCOUNTER — TELEPHONE (OUTPATIENT)
Dept: OBGYN CLINIC | Age: 54
End: 2019-01-25

## 2019-01-25 NOTE — TELEPHONE ENCOUNTER
Received a voicemail from patient requesting a refill on pain medication (percocet). Attempted to return patients call to address concerns. Patient was not available to answer my call. Left a detailed message requesting a return call.

## 2019-01-31 ENCOUNTER — OFFICE VISIT (OUTPATIENT)
Dept: OBGYN CLINIC | Age: 54
End: 2019-01-31

## 2019-01-31 VITALS
WEIGHT: 113.8 LBS | RESPIRATION RATE: 17 BRPM | OXYGEN SATURATION: 100 % | BODY MASS INDEX: 21.49 KG/M2 | HEART RATE: 92 BPM | SYSTOLIC BLOOD PRESSURE: 117 MMHG | HEIGHT: 61 IN | DIASTOLIC BLOOD PRESSURE: 76 MMHG | TEMPERATURE: 98.3 F

## 2019-01-31 DIAGNOSIS — Z09 POSTOP CHECK: Primary | ICD-10-CM

## 2019-01-31 DIAGNOSIS — K59.00 CONSTIPATION, UNSPECIFIED CONSTIPATION TYPE: ICD-10-CM

## 2019-01-31 RX ORDER — NITROFURANTOIN (MACROCRYSTALS) 100 MG/1
CAPSULE ORAL
COMMUNITY
End: 2019-03-14 | Stop reason: ALTCHOICE

## 2019-01-31 RX ORDER — DOCUSATE SODIUM 100 MG/1
100 CAPSULE, LIQUID FILLED ORAL DAILY
Qty: 30 CAP | Refills: 1 | Status: SHIPPED | OUTPATIENT
Start: 2019-01-31 | End: 2019-05-01

## 2019-02-01 ENCOUNTER — TELEPHONE (OUTPATIENT)
Dept: OBGYN CLINIC | Age: 54
End: 2019-02-01

## 2019-02-01 DIAGNOSIS — G89.18 POSTOPERATIVE PAIN: Primary | ICD-10-CM

## 2019-02-01 RX ORDER — ACETAMINOPHEN 500 MG
500 TABLET ORAL EVERY 4 HOURS
Qty: 30 TAB | Refills: 1 | Status: SHIPPED | OUTPATIENT
Start: 2019-02-01 | End: 2019-03-14

## 2019-02-01 NOTE — TELEPHONE ENCOUNTER
Received a call from patient requesting a prescription for pain medication. Patient is 2 weeks post op (hysterectomy). Patient is allergic to aspirin.  Please advise

## 2019-02-01 NOTE — PROGRESS NOTES
,who is 2 weeks postop check for KAYCE & BSO. She is doing well except for constipation. Incision is healing well. Sterile strips removed. Examination: Abd soft and nontender. Pelvic -deferred until the next appt in 4 weeks. RTC -4 weeks.

## 2019-02-01 NOTE — PATIENT INSTRUCTIONS
Constipation: Care Instructions Your Care Instructions Constipation means that you have a hard time passing stools (bowel movements). People pass stools from 3 times a day to once every 3 days. What is normal for you may be different. Constipation may occur with pain in the rectum and cramping. The pain may get worse when you try to pass stools. Sometimes there are small amounts of bright red blood on toilet paper or the surface of stools. This is because of enlarged veins near the rectum (hemorrhoids). A few changes in your diet and lifestyle may help you avoid ongoing constipation. Your doctor may also prescribe medicine to help loosen your stool. Some medicines can cause constipation. These include pain medicines and antidepressants. Tell your doctor about all the medicines you take. Your doctor may want to make a medicine change to ease your symptoms. Follow-up care is a key part of your treatment and safety. Be sure to make and go to all appointments, and call your doctor if you are having problems. It's also a good idea to know your test results and keep a list of the medicines you take. How can you care for yourself at home? · Drink plenty of fluids, enough so that your urine is light yellow or clear like water. If you have kidney, heart, or liver disease and have to limit fluids, talk with your doctor before you increase the amount of fluids you drink. · Include high-fiber foods in your diet each day. These include fruits, vegetables, beans, and whole grains. · Get at least 30 minutes of exercise on most days of the week. Walking is a good choice. You also may want to do other activities, such as running, swimming, cycling, or playing tennis or team sports. · Take a fiber supplement, such as Citrucel or Metamucil, every day. Read and follow all instructions on the label. · Schedule time each day for a bowel movement. A daily routine may help. Take your time having your bowel movement. · Support your feet with a small step stool when you sit on the toilet. This helps flex your hips and places your pelvis in a squatting position. · Your doctor may recommend an over-the-counter laxative to relieve your constipation. Examples are Milk of Magnesia and MiraLax. Read and follow all instructions on the label. Do not use laxatives on a long-term basis. When should you call for help? Call your doctor now or seek immediate medical care if: 
  · You have new or worse belly pain.  
  · You have new or worse nausea or vomiting.  
  · You have blood in your stools.  
 Watch closely for changes in your health, and be sure to contact your doctor if: 
  · Your constipation is getting worse.  
  · You do not get better as expected. Where can you learn more? Go to http://ryder-mynor.info/. Enter 21 [phone] in the search box to learn more about \"Constipation: Care Instructions. \" Current as of: September 23, 2018 Content Version: 11.9 © 8249-7138 [employer]. Care instructions adapted under license by [employer] (which disclaims liability or warranty for this information). If you have questions about a medical condition or this instruction, always ask your healthcare professional. [place] [address] any warranty or liability for your use of this information. Learning About Getting Moving After Surgery What is early mobility after surgery? After you have surgery or a serious illness, your doctors or nurses may want to get you moving as soon as possible. This could mean sitting up, moving from the bed to a chair, or walking around the hospital. 
Early mobility also means that you will be alert enough to do these activities. A physical therapist, a nurse, or another member of your care team will help you. What are the benefits of early mobility? Using your muscles as soon as you can after surgery can help you recover. It may help prevent problems that you can have from staying in the hospital too long. It can help keep muscles strong and improve your breathing. It also can help keep your memory sharp. What can you expect if you get early mobility? You may start your early mobility in an intensive care unit or in another part of the hospital. 
Doctors will manage your pain. But make sure that you're alert enough to start doing some of the movements. Even if you're on a ventilator, which helps you breathe, or attached to an IV, which provides fluids to your body, you can still do some of the early mobility activities. A nurse, a physical therapist, or some other member of your care team will work with you on the movements. This person will make sure that you're safe. For the early part of your recovery, you will have help when you are moving around. Small movements may be harder than you think they should be. This is normal after surgery. These small movements can make a big difference in your recovery. Follow-up care is a key part of your treatment and safety. Be sure to make and go to all appointments, and call your doctor if you are having problems. It's also a good idea to know your test results and keep a list of the medicines you take. Where can you learn more? Go to http://ryder-mynor.info/. Enter A003 in the search box to learn more about \"Learning About Getting Moving After Surgery. \" Current as of: March 28, 2018 Content Version: 11.9 © 6767-0902 [employer]. Care instructions adapted under license by [employer] (which disclaims liability or warranty for this information). If you have questions about a medical condition or this instruction, always ask your healthcare professional. [address] any warranty or liability for your use of this information.

## 2019-02-26 ENCOUNTER — OFFICE VISIT (OUTPATIENT)
Dept: OBGYN CLINIC | Age: 54
End: 2019-02-26

## 2019-02-26 VITALS
HEIGHT: 61 IN | DIASTOLIC BLOOD PRESSURE: 88 MMHG | RESPIRATION RATE: 16 BRPM | HEART RATE: 99 BPM | SYSTOLIC BLOOD PRESSURE: 135 MMHG | BODY MASS INDEX: 21.52 KG/M2 | WEIGHT: 114 LBS | OXYGEN SATURATION: 100 % | TEMPERATURE: 97.1 F

## 2019-02-26 DIAGNOSIS — Z09 POSTOP CHECK: Primary | ICD-10-CM

## 2019-02-26 NOTE — PROGRESS NOTES
,who is 6 weeks postop check for KAYCE & BS. Tomas De La Cruz She is doing well. Incision is healing well. Examination:-Abd --soft and non tender Pelvic --within normal limits. Discharge to be seen by PCP. RTC --prn.

## 2019-02-27 NOTE — PATIENT INSTRUCTIONS
Surgery: What to Expect at [facility] Your Recovery This care sheet gives you a general idea about how long it will take for you to recover. But each person recovers at a different pace. Follow the steps below to get better as quickly as possible. How can you care for yourself at home? Activity 
  · Allow your body to heal. Don't move quickly or lift anything heavy until you are feeling better.  
  · Rest when you feel tired.  
  · Your doctor may give you specific instructions on when you can do your normal activities again, such as driving and going back to work.  
  · Be active. Walking is a good choice. Diet 
  · You can eat your normal diet when you feel well. If your stomach is upset, try bland, low-fat foods like plain rice, broiled chicken, toast, and yogurt.  
  · If your bowel movements are not regular right after surgery, try to avoid constipation and straining. Drink plenty of water. Your doctor may suggest fiber, a stool softener, or a mild laxative. Medicines 
  · Your doctor will tell you if and when you can restart your medicines. He or she will also give you instructions about taking any new medicines.  
  · If you take blood thinners, such as warfarin (Coumadin), clopidogrel (Plavix), or aspirin, be sure to talk to your doctor. He or she will tell you if and when to start taking those medicines again. Make sure that you understand exactly what your doctor wants you to do.  
  · Be safe with medicines. Read and follow all instructions on the label. ? If the doctor gave you a prescription medicine for pain, take it as prescribed. ? If you are not taking a prescription pain medicine, ask your doctor if you can take an over-the-counter medicine. Incision care 
 If your doctor told you how to care for your cut (incision), follow your doctor's instructions. If you did not get instructions, follow this general advice: 
  · You will have a dressing over the cut.  A dressing helps the incision heal and protects it. Your doctor will tell you how to take care of this.  
  · If you have strips of tape on the cut the doctor made, leave the tape on for a week or until it falls off.  
  · If you had stitches or staples, your doctor will tell you when to come back to have them removed.  
  · If you have skin adhesive on the cut, leave it on until it falls off. Skin adhesive is also called liquid stitches.  
  · Change the bandage every day.  
  · Wash the area daily with warm water, and pat it dry. Don't use hydrogen peroxide or alcohol. They can slow healing.  
  · You may cover the area with a gauze bandage if it oozes fluid or rubs against clothing.  
  · You may shower 24 to 48 hours after surgery. Pat the incision dry. Don't swim or take a bath for the first 2 weeks, or until your doctor tells you it is okay. Follow-up care is a key part of your treatment and safety. Be sure to make and go to all appointments, and call your doctor if you are having problems. It's also a good idea to know your test results and keep a list of the medicines you take. When should you call for help? Call 911 anytime you think you may need emergency care. For example, call if: 
  · You passed out (lost consciousness).  
  · You are short of breath.  
 Call your doctor now or seek immediate medical care if: 
  · You have pain that does not get better after you take pain medicine.  
  · You cannot pass stool or gas.  
  · You are sick to your stomach and cannot drink fluids.  
  · You have loose stitches, or your incision comes open.  
  · You have signs of a blood clot in your leg (called a deep vein thrombosis), such as: 
? Pain in your calf, back of the knee, thigh, or groin. ? Redness and swelling in your leg or groin.  
  · You have signs of infection, such as: 
? Increased pain, swelling, warmth, or redness. ? Red streaks leading from the incision. ? Pus draining from the incision. ? A fever.   · Bright red blood has soaked through your bandage.  
 Watch closely for any changes in your health, and be sure to contact your doctor if you have any problems. Where can you learn more? Go to http://ryder-mynor.info/. Enter A011 in the search box to learn more about \"Surgery: What to Expect at Home. \" Current as of: March 28, 2018 Content Version: 11.9 © 7224-1931 [employer]. Care instructions adapted under license by [employer] (which disclaims liability or warranty for this information). If you have questions about a medical condition or this instruction, always ask your healthcare professional. [address] any warranty or liability for your use of this information.

## 2019-03-14 ENCOUNTER — OFFICE VISIT (OUTPATIENT)
Dept: FAMILY MEDICINE CLINIC | Age: 54
End: 2019-03-14

## 2019-03-14 VITALS
DIASTOLIC BLOOD PRESSURE: 80 MMHG | OXYGEN SATURATION: 98 % | SYSTOLIC BLOOD PRESSURE: 120 MMHG | WEIGHT: 116.2 LBS | HEIGHT: 61 IN | TEMPERATURE: 98.5 F | HEART RATE: 90 BPM | BODY MASS INDEX: 21.94 KG/M2 | RESPIRATION RATE: 16 BRPM

## 2019-03-14 DIAGNOSIS — Z00.00 ROUTINE GENERAL MEDICAL EXAMINATION AT A HEALTH CARE FACILITY: ICD-10-CM

## 2019-03-14 DIAGNOSIS — Z00.00 ROUTINE GENERAL MEDICAL EXAMINATION AT A HEALTH CARE FACILITY: Primary | ICD-10-CM

## 2019-03-14 DIAGNOSIS — K80.20 CALCULUS OF GALLBLADDER WITHOUT CHOLECYSTITIS WITHOUT OBSTRUCTION: ICD-10-CM

## 2019-03-14 DIAGNOSIS — Z87.898 HISTORY OF PREDIABETES: ICD-10-CM

## 2019-03-14 DIAGNOSIS — E55.9 VITAMIN D INSUFFICIENCY: ICD-10-CM

## 2019-03-14 DIAGNOSIS — Z86.2 HISTORY OF IRON DEFICIENCY ANEMIA: ICD-10-CM

## 2019-03-14 LAB — HBA1C MFR BLD HPLC: 5.4 %

## 2019-03-14 NOTE — PROGRESS NOTES
HISTORY OF PRESENT ILLNESS  Deedee Ridley is a 48 y.o. female. Physical   The history is provided by the patient and medical records. Pertinent negatives include no chest pain, no abdominal pain, no headaches and no shortness of breath. Past Medical History:   Diagnosis Date    GERD (gastroesophageal reflux disease)      Past Surgical History:   Procedure Laterality Date    HX  SECTION      HX COLONOSCOPY N/A 2017    normal    HX HYSTERECTOMY  01/15/2015    also salpingo/oophorectomy    HX HYSTEROSCOPY WITH ENDOMETRIAL ABLATION       Family History   Problem Relation Age of Onset    Hypertension Mother     Diabetes Mother    [facility] Hearing Impairment Mother     Kidney Disease Mother     Cancer Father         bladder cancer     Diabetes Father     Diabetes Maternal Grandmother     Hypertension Maternal Grandmother     Heart Attack Maternal Grandmother     Cancer Maternal Grandfather         unknown    Heart Disease Neg Hx      Allergies   Allergen Reactions    Aspirin Other (comments)     Pt reports bruising     Social History     Tobacco Use   Smoking Status Never Smoker   Smokeless Tobacco Never Used     Social History     Substance and Sexual Activity   Alcohol Use Yes    Comment: social drinker      Health Maintenance Review:  Colonoscopy - age 50, normal  Mammogram - 2018, normal  Pap Smear - N/A    Immunizations:  Tetanus - declines  Influenza - declines        Review of Systems   Constitutional: Negative for chills, fever and weight loss. HENT: Negative for hearing loss. Eyes: Negative for blurred vision and double vision. Respiratory: Negative for cough, shortness of breath and wheezing. Cardiovascular: Negative for chest pain, palpitations and leg swelling. Gastrointestinal: Positive for constipation. Negative for abdominal pain, blood in stool, diarrhea, heartburn, melena, nausea and vomiting.         Cholelithiasis - currently asymptomatic, surgery deferred because of more urgent need to proceed with hysterectomy   Genitourinary: Negative for dysuria and urgency. 2 months S/P KAYCE BSO with removal of extremely large uterine fibriods   Musculoskeletal: Positive for myalgias. Negative for joint pain. Skin: Negative for itching and rash. Neurological: Negative for dizziness, tingling, sensory change, focal weakness and headaches. Endo/Heme/Allergies: Negative for environmental allergies. Bruises/bleeds easily. Anemia secondary to uterine fibroids prior to surgery - has stopped oral iron because of constipation   Psychiatric/Behavioral: Negative for depression. The patient is not nervous/anxious and does not have insomnia. Visit Vitals  /80 (BP 1 Location: Left arm, BP Patient Position: Sitting)   Pulse 90   Temp 98.5 °F (36.9 °C) (Oral)   Resp 16   Ht 5' 1\" (1.549 m)   Wt 116 lb 3.2 oz (52.7 kg)   LMP 12/15/2018 (Approximate)   SpO2 98%   BMI 21.96 kg/m²       Physical Exam   Constitutional: She is oriented to person, place, and time. She appears well-developed and well-nourished. HENT:   Head: Normocephalic. Right Ear: Tympanic membrane and ear canal normal.   Left Ear: Tympanic membrane and ear canal normal.   Mouth/Throat: Oropharynx is clear and moist.   Eyes: Conjunctivae and EOM are normal. Pupils are equal, round, and reactive to light. Neck: Neck supple. Cardiovascular: Normal rate, regular rhythm, normal heart sounds and intact distal pulses. Pulmonary/Chest: Effort normal and breath sounds normal.   Abdominal: Soft. Bowel sounds are normal. She exhibits no mass. There is no tenderness. Musculoskeletal: She exhibits no edema. Neurological: She is alert and oriented to person, place, and time. She has normal reflexes. Skin: Skin is warm and dry. Psychiatric: She has a normal mood and affect. Her behavior is normal.   Nursing note and vitals reviewed.     Results for Stacie Sung (MRN [id number]) as of 3/14/2019 17:37   Ref. Range 3/20/2018 08:12 8/16/2018 08:09 9/1/2018 10:20 10/26/2018 07:38 1/7/2019 06:49 1/7/2019 07:11 1/15/2019 06:31 1/15/2019 09:20 1/16/2019 06:44 3/14/2019 15:55   Hemoglobin A1c, (calculated) Latest Ref Range: 4.2 - 5.6 % 6.0 (H) 5.5           Hemoglobin A1c (POC) Latest Units: %          5.4     ASSESSMENT and PLAN    ICD-10-CM ICD-9-CM    1. Routine general medical examination at a health care facility Z00.00 V70.0 CBC WITH AUTOMATED DIFF      LIPID PANEL      METABOLIC PANEL, COMPREHENSIVE      URINALYSIS W/ RFLX MICROSCOPIC      TSH 3RD GENERATION      VITAMIN D, 25 HYDROXY   2. History of prediabetes Z87.898 V12.29 AMB POC HEMOGLOBIN A1C   3. Calculus of gallbladder without cholecystitis without obstruction K80.20 574.20    4. Vitamin D insufficiency E55.9 268.9 VITAMIN D, 25 HYDROXY   5. History of iron deficiency anemia Z86.2 V12.3 CBC WITH AUTOMATED DIFF   Return for fasting lab, further disposition pending lab results if indicated. Anticipatory guidance and recommendations provided verbally and with printed information. Return for annual physical in 1 year, sooner with any problems.

## 2019-03-14 NOTE — PROGRESS NOTES
Estuardo Leslie is a 48 y.o. female (: 1965) presenting to address:    Chief Complaint   Patient presents with    Other     Patient states that she is here for a follow up from her surgery. Pt declined flu vaccine. Vitals:    19 1544   BP: 120/80   Pulse: 90   Resp: 16   Temp: 98.5 °F (36.9 °C)   TempSrc: Oral   SpO2: 98%   Weight: 116 lb 3.2 oz (52.7 kg)   Height: 5' 1\" (1.549 m)   PainSc:   0 - No pain   LMP: 12/15/2018       Hearing/Vision:   No exam data present    Learning Assessment:     Learning Assessment 2019   PRIMARY LEARNER Patient   HIGHEST LEVEL OF EDUCATION - PRIMARY LEARNER  4 YEARS OF COLLEGE   BARRIERS PRIMARY LEARNER NONE   CO-LEARNER CAREGIVER No   PRIMARY LANGUAGE ENGLISH   LEARNER PREFERENCE PRIMARY LISTENING     DEMONSTRATION     READING   ANSWERED BY patient   RELATIONSHIP SELF     Depression Screening:     3 most recent PHQ Screens 3/14/2019   Little interest or pleasure in doing things Not at all   Feeling down, depressed, irritable, or hopeless Not at all   Total Score PHQ 2 0     Fall Risk Assessment:   No flowsheet data found. Abuse Screening:   No flowsheet data found. Coordination of Care Questionaire:   1. Have you been to the ER, urgent care clinic since your last visit? Hospitalized since your last visit? NO    2. Have you seen or consulted any other health care providers outside of the [address] [place] [address] since your last visit? Include any pap smears or colon screening. NO    Advanced Directive:   1. Do you have an Advanced Directive? NO    2. Would you like information on Advanced Directives?  NO

## 2019-03-14 NOTE — PATIENT INSTRUCTIONS
Return for fasting lab, further disposition pending lab results if indicated. Anticipatory guidance and recommendations provided verbally and with printed information. Return for annual physical in 1 year, sooner with any problems. Well Visit, Women 48 to 72: Care Instructions  Your Care Instructions    Physical exams can help you stay healthy. Your doctor has checked your overall health and may have suggested ways to take good care of yourself. He or she also may have recommended tests. At home, you can help prevent illness with healthy eating, regular exercise, and other steps. Follow-up care is a key part of your treatment and safety. Be sure to make and go to all appointments, and call your doctor if you are having problems. It's also a good idea to know your test results and keep a list of the medicines you take. How can you care for yourself at home? · Reach and stay at a healthy weight. This will lower your risk for many problems, such as obesity, diabetes, heart disease, and high blood pressure. · Get at least 30 minutes of exercise on most days of the week. Walking is a good choice. You also may want to do other activities, such as running, swimming, cycling, or playing tennis or team sports. · Do not smoke. Smoking can make health problems worse. If you need help quitting, talk to your doctor about stop-smoking programs and medicines. These can increase your chances of quitting for good. · Protect your skin from too much sun. When you're outdoors from 10 a.m. to 4 p.m., stay in the shade or cover up with clothing and a hat with a wide brim. Wear sunglasses that block UV rays. Even when it's cloudy, put broad-spectrum sunscreen (SPF 30 or higher) on any exposed skin. · See a dentist one or two times a year for checkups and to have your teeth cleaned. · Wear a seat belt in the car. · Limit alcohol to 1 drink a day. Too much alcohol can cause health problems.   Follow your doctor's advice about when to have certain tests. These tests can spot problems early. · Cholesterol. Your doctor will tell you how often to have this done based on your age, family history, or other things that can increase your risk for heart attack and stroke. · Blood pressure. Have your blood pressure checked during a routine doctor visit. Your doctor will tell you how often to check your blood pressure based on your age, your blood pressure results, and other factors. · Mammogram. Ask your doctor how often you should have a mammogram, which is an X-ray of your breasts. A mammogram can spot breast cancer before it can be felt and when it is easiest to treat. · Pap test and pelvic exam. Ask your doctor how often you should have a Pap test. You may not need to have a Pap test as often as you used to. · Vision. Have your eyes checked every year or two or as often as your doctor suggests. Some experts recommend that you have yearly exams for glaucoma and other age-related eye problems starting at age 48. · Hearing. Tell your doctor if you notice any change in your hearing. You can have tests to find out how well you hear. · Diabetes. Ask your doctor whether you should have tests for diabetes. · Colon cancer. You should begin tests for colon cancer at age 48. You may have one of several tests. Your doctor will tell you how often to have tests based on your age and risk. Risks include whether you already had a precancerous polyp removed from your colon or whether your parents, sisters and brothers, or children have had colon cancer. · Thyroid disease. Talk to your doctor about whether to have your thyroid checked as part of a regular physical exam. Women have an increased chance of a thyroid problem. · Osteoporosis. You should begin tests for bone density at age 72. If you are younger than 72, ask your doctor whether you have factors that may increase your risk for this disease.  You may want to have this test before age 65.  · Heart attack and stroke risk. At least every 4 to 6 years, you should have your risk for heart attack and stroke assessed. Your doctor uses factors such as your age, blood pressure, cholesterol, and whether you smoke or have diabetes to show what your risk for a heart attack or stroke is over the next 10 years. When should you call for help? Watch closely for changes in your health, and be sure to contact your doctor if you have any problems or symptoms that concern you. Where can you learn more? Go to http://ryder-mynor.info/. Enter B835 in the search box to learn more about \"Well Visit, Women 50 to 72: Care Instructions. \"  Current as of: March 28, 2018  Content Version: 11.9  © 9543-9919 [employer]. Care instructions adapted under license by [employer] (which disclaims liability or warranty for this information). If you have questions about a medical condition or this instruction, always ask your healthcare professional. [place] [address] any warranty or liability for your use of this information.

## 2019-03-23 DIAGNOSIS — R79.89 ELEVATED LFTS: Primary | ICD-10-CM

## 2019-03-23 LAB
25(OH)D3+25(OH)D2 SERPL-MCNC: 30.4 NG/ML (ref 30–100)
ALBUMIN SERPL-MCNC: 4.6 G/DL (ref 3.5–5.5)
ALBUMIN/GLOB SERPL: 1.7 {RATIO} (ref 1.2–2.2)
ALP SERPL-CCNC: 99 IU/L (ref 39–117)
ALT SERPL-CCNC: 135 IU/L (ref 0–32)
APPEARANCE UR: CLEAR
AST SERPL-CCNC: 65 IU/L (ref 0–40)
BASOPHILS # BLD AUTO: 0 X10E3/UL (ref 0–0.2)
BASOPHILS NFR BLD AUTO: 1 %
BILIRUB SERPL-MCNC: 0.2 MG/DL (ref 0–1.2)
BILIRUB UR QL STRIP: NEGATIVE
BUN SERPL-MCNC: 9 MG/DL (ref 6–24)
BUN/CREAT SERPL: 13 (ref 9–23)
CALCIUM SERPL-MCNC: 9.3 MG/DL (ref 8.7–10.2)
CHLORIDE SERPL-SCNC: 103 MMOL/L (ref 96–106)
CHOLEST SERPL-MCNC: 158 MG/DL (ref 100–199)
CO2 SERPL-SCNC: 23 MMOL/L (ref 20–29)
COLOR UR: YELLOW
CREAT SERPL-MCNC: 0.7 MG/DL (ref 0.57–1)
EOSINOPHIL # BLD AUTO: 0.1 X10E3/UL (ref 0–0.4)
EOSINOPHIL NFR BLD AUTO: 2 %
ERYTHROCYTE [DISTWIDTH] IN BLOOD BY AUTOMATED COUNT: 13.6 % (ref 12.3–15.4)
GLOBULIN SER CALC-MCNC: 2.7 G/DL (ref 1.5–4.5)
GLUCOSE SERPL-MCNC: 77 MG/DL (ref 65–99)
GLUCOSE UR QL: NEGATIVE
HCT VFR BLD AUTO: 37.6 % (ref 34–46.6)
HDLC SERPL-MCNC: 63 MG/DL
HGB BLD-MCNC: 12.1 G/DL (ref 11.1–15.9)
HGB UR QL STRIP: NEGATIVE
IMM GRANULOCYTES # BLD AUTO: 0 X10E3/UL (ref 0–0.1)
IMM GRANULOCYTES NFR BLD AUTO: 0 %
INTERPRETATION, 910389: NORMAL
KETONES UR QL STRIP: NEGATIVE
LDLC SERPL CALC-MCNC: 87 MG/DL (ref 0–99)
LEUKOCYTE ESTERASE UR QL STRIP: NEGATIVE
LYMPHOCYTES # BLD AUTO: 1.5 X10E3/UL (ref 0.7–3.1)
LYMPHOCYTES NFR BLD AUTO: 48 %
MCH RBC QN AUTO: 28.3 PG (ref 26.6–33)
MCHC RBC AUTO-ENTMCNC: 32.2 G/DL (ref 31.5–35.7)
MCV RBC AUTO: 88 FL (ref 79–97)
MICRO URNS: NORMAL
MONOCYTES # BLD AUTO: 0.3 X10E3/UL (ref 0.1–0.9)
MONOCYTES NFR BLD AUTO: 11 %
NEUTROPHILS # BLD AUTO: 1.1 X10E3/UL (ref 1.4–7)
NEUTROPHILS NFR BLD AUTO: 38 %
NITRITE UR QL STRIP: NEGATIVE
PH UR STRIP: 6 [PH] (ref 5–7.5)
PLATELET # BLD AUTO: 199 X10E3/UL (ref 150–379)
POTASSIUM SERPL-SCNC: 4.1 MMOL/L (ref 3.5–5.2)
PROT SERPL-MCNC: 7.3 G/DL (ref 6–8.5)
PROT UR QL STRIP: NEGATIVE
RBC # BLD AUTO: 4.27 X10E6/UL (ref 3.77–5.28)
SODIUM SERPL-SCNC: 140 MMOL/L (ref 134–144)
SP GR UR: 1.01 (ref 1–1.03)
TRIGL SERPL-MCNC: 41 MG/DL (ref 0–149)
TSH SERPL DL<=0.005 MIU/L-ACNC: 1.32 UIU/ML (ref 0.45–4.5)
UROBILINOGEN UR STRIP-MCNC: 0.2 MG/DL (ref 0.2–1)
VLDLC SERPL CALC-MCNC: 8 MG/DL (ref 5–40)
WBC # BLD AUTO: 3 X10E3/UL (ref 3.4–10.8)

## 2019-03-23 NOTE — PROGRESS NOTES
Please ask the lab to run a Hep C antibody, Hep B core antibody and Hep B surface antigen, Hep B surface antibody and hepatic function panel on this specimen.

## 2019-03-26 DIAGNOSIS — R79.89 ELEVATED LFTS: Primary | ICD-10-CM

## 2019-03-26 PROBLEM — K80.20 CALCULUS OF GALLBLADDER WITHOUT CHOLECYSTITIS WITHOUT OBSTRUCTION: Status: ACTIVE | Noted: 2019-03-26

## 2019-03-26 LAB — SPECIMEN STATUS REPORT, ROLRST: NORMAL

## 2019-03-26 NOTE — PROGRESS NOTES
Please advise that lab results show no significant abnormalities except for mild elevation of liver tests. Two liver test, the ALT and AST, were mildly elevated. Screening tests for chronic hepatitis were done and were negative. This may be related to the gallstones in the gallbladder which is right under the leverI recommend avoiding alcohol and Tylenol for 2-3 weeks, then come by the office for repeat lab. There is no need to fast and an appointment is not required. If the test is abnormal we will consider obtaining an ultrasound of the liver. Most likely this is caused by the gallstones or fat in the liver and no specific treatment will be necessary.

## 2019-04-02 ENCOUNTER — TELEPHONE (OUTPATIENT)
Dept: OBGYN CLINIC | Age: 54
End: 2019-04-02

## 2019-04-02 NOTE — TELEPHONE ENCOUNTER
Received voicemail to call patient regard Short term disability forms. Patient identified using name and . Patient given confirmation of appointment and surgery dates for paperwork.  Denies further needs

## 2019-04-29 LAB — MAMMOGRAPHY, EXTERNAL: NORMAL

## 2019-05-03 ENCOUNTER — HOSPITAL ENCOUNTER (OUTPATIENT)
Dept: LAB | Age: 54
Discharge: HOME | End: 2019-05-03
Payer: MEDICAID

## 2019-05-03 DIAGNOSIS — R79.89 ELEVATED LFTS: ICD-10-CM

## 2019-05-03 LAB
ALBUMIN SERPL-MCNC: 4.4 G/DL (ref 3.4–5)
ALBUMIN/GLOB SERPL: 1.4 {RATIO} (ref 0.8–1.7)
ALP SERPL-CCNC: 95 U/L (ref 45–117)
ALT SERPL-CCNC: 66 U/L (ref 13–56)
AST SERPL-CCNC: 45 U/L (ref 15–37)
BILIRUB DIRECT SERPL-MCNC: <0.1 MG/DL (ref 0–0.2)
BILIRUB SERPL-MCNC: 0.2 MG/DL (ref 0.2–1)
GLOBULIN SER CALC-MCNC: 3.2 G/DL (ref 2–4)
PROT SERPL-MCNC: 7.6 G/DL (ref 6.4–8.2)

## 2019-05-03 PROCEDURE — 86704 HEP B CORE ANTIBODY TOTAL: CPT

## 2019-05-03 PROCEDURE — 80076 HEPATIC FUNCTION PANEL: CPT

## 2019-05-03 PROCEDURE — 86706 HEP B SURFACE ANTIBODY: CPT

## 2019-05-03 PROCEDURE — 36415 COLL VENOUS BLD VENIPUNCTURE: CPT

## 2019-05-03 PROCEDURE — 86803 HEPATITIS C AB TEST: CPT

## 2019-05-03 PROCEDURE — 87340 HEPATITIS B SURFACE AG IA: CPT

## 2019-05-04 LAB — HBV CORE AB SERPL QL IA: NEGATIVE

## 2019-05-06 LAB
HBV SURFACE AB SER QL IA: POSITIVE
HBV SURFACE AB SERPL IA-ACNC: >1000 MIU/ML
HBV SURFACE AG SER QL: <0.1 INDEX
HBV SURFACE AG SER QL: NEGATIVE
HCV AB SER IA-ACNC: 0.02 INDEX
HCV AB SERPL QL IA: NEGATIVE
HCV COMMENT,HCGAC: NORMAL
HEP BS AB COMMENT,HBSAC: NORMAL

## 2019-05-06 NOTE — PROGRESS NOTES
Please advised that all of the tests for chronic hepatitis were negative and the liver enzyme levels have improved substantially and are nearly back to normal.  If the improvement is due to a decrease in alcohol consumption would recommend that she continue to avoid alcohol. Would recommend follow-up and repeat lab in about 6 months.

## 2019-05-07 NOTE — PROGRESS NOTES
Spoke with patient regarding lab results; patient would like to know if the mass on her gallbladder would affect her liver enzyme levels; patient states she is only a social drinker; please advise.

## 2019-05-07 NOTE — PROGRESS NOTES
It is possible that from time to time the gallstones cause inflammation in the gallbladder which could also cause inflammation in the liver but I would expect she would have symptoms such as abdominal pain and nausea when that occurs.

## 2019-05-08 NOTE — PROGRESS NOTES
Left 2nd message for patient to return call; playing phone tag with patient, when she calls back, ask if it is ok to leave message on voicemail.

## 2019-05-10 NOTE — PROGRESS NOTES
Spoke with patient at great length, patient states she is very concerned with her lab results, liver tests, is not comfortable with the lab being slightly abnormal; what is the next step she can take, does she need a referral back to general surgeon or should she see a liver specialist; please advise.

## 2019-05-13 DIAGNOSIS — R79.89 ELEVATED LFTS: Primary | ICD-10-CM

## 2019-05-13 NOTE — PROGRESS NOTES
Spoke with patient at great length and because of her questions regarding the lab results; patient was transferred to  to schedule appt.

## 2019-05-16 ENCOUNTER — OFFICE VISIT (OUTPATIENT)
Dept: FAMILY MEDICINE CLINIC | Age: 54
End: 2019-05-16

## 2019-05-16 VITALS
TEMPERATURE: 98.1 F | BODY MASS INDEX: 21.67 KG/M2 | SYSTOLIC BLOOD PRESSURE: 136 MMHG | OXYGEN SATURATION: 99 % | DIASTOLIC BLOOD PRESSURE: 80 MMHG | HEIGHT: 61 IN | WEIGHT: 114.8 LBS | HEART RATE: 92 BPM | RESPIRATION RATE: 14 BRPM

## 2019-05-16 DIAGNOSIS — R79.89 ELEVATED LFTS: Primary | ICD-10-CM

## 2019-05-16 NOTE — PROGRESS NOTES
Rudy Dinh is a 48 y.o. female (: 1965) presenting to address: Chief Complaint Patient presents with  Results  
  discuss lab results  Spasms  
  bilat leg spasms only at night Vitals:  
 19 1532 BP: 136/80 Pulse: 92 Resp: 14 Temp: 98.1 °F (36.7 °C) TempSrc: Oral  
SpO2: 99% Weight: 114 lb 12.8 oz (52.1 kg) Height: 5' 1\" (1.549 m) PainSc:   0 - No pain LMP: 12/15/2018 Hearing/Vision: No exam data present Learning Assessment:  
 
Learning Assessment 2019 PRIMARY LEARNER Patient HIGHEST LEVEL OF EDUCATION - PRIMARY LEARNER  4 YEARS OF COLLEGE  
BARRIERS PRIMARY LEARNER NONE  
CO-LEARNER CAREGIVER No  
PRIMARY LANGUAGE ENGLISH  
LEARNER PREFERENCE PRIMARY LISTENING  
  DEMONSTRATION  
  READING  
ANSWERED BY patient RELATIONSHIP SELF Depression Screening:  
 
3 most recent PHQ Screens 2019 Little interest or pleasure in doing things Not at all Feeling down, depressed, irritable, or hopeless Not at all Total Score PHQ 2 0 Fall Risk Assessment:  
 
Fall Risk Assessment, last 12 mths 2019 Able to walk? Yes Fall in past 12 months? No  
 
Abuse Screening:  
 
Abuse Screening Questionnaire 2019 Do you ever feel afraid of your partner? Nazia Fritz Are you in a relationship with someone who physically or mentally threatens you? Nazia Fritz Is it safe for you to go home? Loretta Hernandez Coordination of Care Questionaire: 1. Have you been to the ER, urgent care clinic since your last visit? Hospitalized since your last visit? NO 
 
2. Have you seen or consulted any other health care providers outside of the [address] [place] [address] since your last visit? Include any pap smears or colon screening. NO Advanced Directive: 1. Do you have an Advanced Directive? NO 
 
2. Would you like information on Advanced Directives?  NO

## 2019-05-16 NOTE — PROGRESS NOTES
HISTORY OF PRESENT ILLNESS Vesna Shepard is a 48 y.o. female. Abnormal Lab Results The history is provided by the patient and medical records. Pertinent negatives include no chest pain and no abdominal pain. Mr#: [id number] Patient Active Problem List  
Diagnosis Code  Vitamin D insufficiency E55.9  Trapezius muscle spasm M62.838  
 Gastroesophageal reflux disease K21.9  Prediabetes R73.03  
 Fibroids D21.9  Elevated LFTs R94.5  Calculus of gallbladder without cholecystitis without obstruction K80.20 Current Outpatient Medications:  
  latanoprost (XALATAN) 0.005 % ophthalmic solution, latanoprost 0.005 % eye drops  INSTILL 1 DROP IN BOTH EYES AT BEDTIME, Disp: , Rfl:  
  multivitamin (ONE A DAY) tablet, Take 1 Tab by mouth daily. Organic Life multivitamin, Disp: , Rfl:  
  biotin 10,000 mcg cap, Take  by mouth., Disp: , Rfl:  
  
Allergies Allergen Reactions  Aspirin Other (comments) Pt reports bruising Review of Systems Constitutional: Negative for fever and weight loss. Cardiovascular: Negative for chest pain and palpitations. Gastrointestinal: Negative for abdominal pain, diarrhea, heartburn, nausea and vomiting. The patient was recently found to have mildly elevated LFTs on routine lab studies. Screening tests for chronic hepatitis were negative. Repeat LFTs were much improved but still minimally elevated. Patient has a history of an apparent single gallstone but has not had any symptoms since modifying her diet. She does not consume alcohol excessively. Psychiatric/Behavioral: The patient is nervous/anxious ( Anxious about recent elevated LFTs). Physical Exam  
Constitutional: She is oriented to person, place, and time. She appears well-developed and well-nourished. HENT:  
Head: Normocephalic. Eyes: EOM are normal.  
Neck: Neck supple. Cardiovascular: Normal rate.   
Pulmonary/Chest: Effort normal.  
 Musculoskeletal: She exhibits no edema. Neurological: She is alert and oriented to person, place, and time. Skin: Skin is warm and dry. Psychiatric: She has a normal mood and affect. Her behavior is normal.  
Nursing note and vitals reviewed. ASSESSMENT and PLAN 
  ICD-10-CM ICD-9-CM 1. Elevated LFTs R94.5 790.6 Lab results reviewed, patient reassured that there is no evidence of any significant pathological process. She indicates understanding. We will continue with plan to repeat random hepatic function panel.

## 2019-10-08 NOTE — PROGRESS NOTES
[facility] [address]  [address] [facility]  [address] [place] [id number]  Dept Phone: [phone]       10/08/19    Kurtis Carcamo  [address]  [facility] [address]    Dear ELKIN Salas PREPARATION AND INSTRUCTIONS    · INFORM YOUR PHYSICIAN IF YOU TAKE COUMADIN, PLAVIX OR ANY OTHER BLOOD THINNER  · YOU DO NOT NEED TO DISCONTINUE ASPIRIN      DAY OF EXAM:   November 20th    1. Nothing to eat or drink after midnight the night before the exam.   [facility]. [facility] located on the main floor of the hospital at 8am .   3. Your procedure is at 9am .  4. Due to sedation, you will need to have a  to take you home. GI lab policy states you may not take a cab home.      5. If you have HMO insurance, we will generate a referral for you.  6. If you should have any questions or difficulties please feel free to contact a clinical associate during office hours (9-5). If the office is closed the answering service will contact the physician on call.    IF YOU HAVE A CHANGE IN HEALTH STATUS BETWEEN SCHEDULING AND THE DATE OF YOUR PROCEDURE PLEASE LET US KNOW.           PLEASE NOTE:       IT IS YOUR RESPONSIBILITY TO VERIFY WITH YOUR INSURANCE WHETHER OR NOT THIS PROCEDURE IS A COVERED BENEFIT UNDER YOUR INSURANCE POLICY.  IF IT IS NOT A COVERED BENEFIT YOU WILL BE RESPONSIBLE FOR PAYMENT    Thank You!       Cyndee Xiong is a 48 y.o. female here for neck pain      Cyndee Xiong is a 48 y.o. female (: 1965) presenting to address:    Chief Complaint   Patient presents with    Back Pain     pt states she's had upper back/neck/ shoulder pain for a few months. feels stiff, often hears cracking sound        Vitals:    18 0735   BP: 110/86   Pulse: 72   Resp: 18   SpO2: 98%   Weight: 133 lb 12.8 oz (60.7 kg)   Height: 5' 1\" (1.549 m)   PainSc:   0 - No pain       Hearing/Vision:   No exam data present    Learning Assessment:     Learning Assessment 2017   PRIMARY LEARNER Patient   HIGHEST LEVEL OF EDUCATION - PRIMARY LEARNER  4 YEARS OF COLLEGE   BARRIERS PRIMARY LEARNER NONE   CO-LEARNER CAREGIVER No   PRIMARY LANGUAGE ENGLISH   LEARNER PREFERENCE PRIMARY DEMONSTRATION     VIDEOS   ANSWERED BY patient    RELATIONSHIP SELF     Depression Screening:     PHQ over the last two weeks 3/20/2018   Little interest or pleasure in doing things Not at all   Feeling down, depressed or hopeless Not at all   Total Score PHQ 2 0     Fall Risk Assessment:   No flowsheet data found. Abuse Screening:   No flowsheet data found. Coordination of Care Questionaire:   1. Have you been to the ER, urgent care clinic since your last visit? Hospitalized since your last visit? NO    2. Have you seen or consulted any other health care providers outside of the [address] [place] [address] since your last visit? Include any pap smears or colon screening. NO    Advanced Directive:   1. Do you have an Advanced Directive? NO    2. Would you like information on Advanced Directives?  NO

## 2019-11-18 ENCOUNTER — OFFICE VISIT (OUTPATIENT)
Dept: FAMILY MEDICINE CLINIC | Age: 54
End: 2019-11-18

## 2019-11-18 VITALS
WEIGHT: 118.6 LBS | OXYGEN SATURATION: 100 % | HEART RATE: 101 BPM | HEIGHT: 61 IN | RESPIRATION RATE: 14 BRPM | BODY MASS INDEX: 22.39 KG/M2 | SYSTOLIC BLOOD PRESSURE: 122 MMHG | TEMPERATURE: 98.1 F | DIASTOLIC BLOOD PRESSURE: 84 MMHG

## 2019-11-18 DIAGNOSIS — M79.9 MUSCULOSKELETAL DISORDER: Primary | ICD-10-CM

## 2019-11-18 NOTE — PROGRESS NOTES
HISTORY OF PRESENT ILLNESS  Deedee Tilley is a 48 y.o. female. Ms. Zaira Rowland reports initially noting discomfort in her central upper back about 2 months ago and since that time she has began to notice this in different areas predominantly around her back. She states that she does not notice the symptoms much during the day when she is busy doing other things but in the evening it is bothersome and can interfere with her getting to sleep. She is not aware of any activity that exacerbates or relieves the symptoms. She also notices stiffness in the fingers of her hands intermittently and has also been treated for carpal tunnel with nighttime wrist splints. She reports that she was diagnosed with juvenile rheumatoid arthritis as a child. She indicates that she is especially concerned about the back pain since her father had back pain resulting from bony metastases from carcinoma of the bladder and she worries that this might be a symptom of cancer. Back Pain    The history is provided by the patient and medical records. Pertinent negatives include no chest pain, no fever and no weight loss. Mr#: [id number]      Patient Active Problem List   Diagnosis Code    Vitamin D insufficiency E55.9    Trapezius muscle spasm M62.838    Gastroesophageal reflux disease K21.9    Prediabetes R73.03    Fibroids D21.9    Elevated LFTs R94.5    Calculus of gallbladder without cholecystitis without obstruction K80.20         Current Outpatient Medications:     latanoprost (XALATAN) 0.005 % ophthalmic solution, latanoprost 0.005 % eye drops  INSTILL 1 DROP IN BOTH EYES AT BEDTIME, Disp: , Rfl:     multivitamin (ONE A DAY) tablet, Take 1 Tab by mouth daily. Organic Life multivitamin, Disp: , Rfl:     biotin 10,000 mcg cap, Take  by mouth., Disp: , Rfl:      Allergies   Allergen Reactions    Aspirin Other (comments)     Pt reports bruising         Review of Systems   Constitutional: Positive for malaise/fatigue. Negative for chills, fever and weight loss. Respiratory: Negative for shortness of breath. Cardiovascular: Negative for chest pain, palpitations and leg swelling. Gastrointestinal: Positive for nausea (at times). Musculoskeletal: Positive for back pain. Fingers episodically feel stiff   Neurological: Positive for dizziness (light headed). Visit Vitals  /84 (BP 1 Location: Left arm, BP Patient Position: Sitting)   Pulse (!) 101   Temp 98.1 °F (36.7 °C) (Oral)   Resp 14   Ht 5' 1\" (1.549 m)   Wt 118 lb 9.6 oz (53.8 kg)   LMP 12/15/2018 (Approximate)   SpO2 100%   BMI 22.41 kg/m²       Physical Exam   Constitutional: She is oriented to person, place, and time. She appears well-developed and well-nourished. HENT:   Head: Normocephalic. Eyes: Conjunctivae and EOM are normal.   Neck: Neck supple. Cardiovascular: Normal rate, regular rhythm and normal heart sounds. Pulmonary/Chest: Effort normal and breath sounds normal.   Musculoskeletal: She exhibits tenderness ( Very minimal tenderness to palpation central upper back and left parascapular with no reproduction of symptoms with repositioning of the back or torsion of the torso. ). She exhibits no edema or deformity. Hands without synovial thickening noted at any joints. Neurological: She is alert and oriented to person, place, and time. Skin: Skin is warm and dry. Psychiatric: She has a normal mood and affect. Her behavior is normal.   Nursing note and vitals reviewed. ASSESSMENT and PLAN    ICD-10-CM ICD-9-CM    1. Musculoskeletal disorder M79.9 729.90    Return for lab studies, further disposition pending lab results and clinical course  She indicates that she will need to check with her insurance carrier to see if the recommended lab tests are covered and will call back. Reassured that the symptoms do not suggest an underlying malignant process.

## 2019-11-18 NOTE — PATIENT INSTRUCTIONS
Return for lab studies, further disposition pending lab results and clinical course       Lupus: Care Instructions  Your Care Instructions  Lupus is a long-term disease that can cause inflammation, pain, and tissue damage in your body. It is an autoimmune disease. This means the immune system attacks its own tissues. Lupus may cause problems with your skin, kidneys, heart, lungs, nerves, or blood cells. This information is about systemic lupus erythematosus (SLE). SLE is the most common and most serious type of lupus. But there are other types of lupus, such as discoid or cutaneous lupus, drug-induced systemic lupus, and  lupus. When you have lupus symptoms, you are having flares or relapses. When your symptoms get better, you are in remission. Lupus may get worse very quickly. There is no way to tell when a flare will happen or how bad it will be. When you have a lupus flare, you may have new symptoms as well as symptoms you have had in the past.  Learn your body's signs of a flare, such as joint pain, a rash, a fever, or being more tired. When you see any of these signs, take steps to control your symptoms. Follow-up care is a key part of your treatment and safety. Be sure to make and go to all appointments, and call your doctor if you are having problems. It's also a good idea to know your test results and keep a list of the medicines you take. How can you care for yourself at home? Reduce stress and tiredness  · Keep your daily schedule as simple as possible. · Keep your list of things to do as short as you can. · Exercise regularly. A daily walk or swim, for example, can lower stress, clear your head, improve your mood, and help fight tiredness. · Use meditation, yoga, or guided imagery to relax. · Get plenty of rest. Some people with lupus need up to 12 hours of sleep every night. · Pace yourself. Do not do too many activities. · Ask others for help.  Do not try to do everything yourself. · Take short breaks from your usual activities. Think about cutting down on work hours when your symptoms are severe. · If you think that depression or anxiety is making you feel more tired, talk to your doctor, a mental health professional, or both. Take care of your skin  · Ask your doctor about the use of corticosteroid creams for skin symptoms. · If you are bothered by the way a lupus rash looks on your face or if you have scars from lupus, you can try makeup, such as Covermark, to cover the rash or scars. · Stay out of the sun, especially when the sun's rays are the strongest, usually between 10 a.m. and 4 p.m. If you must be in the sun, cover your arms and legs, and wear a hat. Make sure to use a broad-spectrum sunscreen that has a sun protection factor (SPF) of 50 or higher. Put more sunscreen on after swimming, sweating, or toweling off. Practice good self-care  · Learn more about lupus and how to take care of yourself. · Take your medicines exactly as prescribed. Call your doctor if you have any problems with your medicine. · Do not smoke. If you need help quitting, talk to your doctor about stop-smoking programs and medicines. These can increase your chances of quitting for good. · Eat a healthy, balanced diet. A balanced diet includes whole grains, dairy, fruits and vegetables, and protein. Eat a variety of foods from each of those groups so you get all the nutrients you need. · Avoid other people who are sick with colds or the flu. These illnesses can cause lupus flares. Talk to your doctor about flu shots and pneumococcal vaccinations. If you do get sick or think you are getting an infection, talk with your doctor so you can treat your symptoms right away. · Brush and floss your teeth each day. See your dentist two times a year. · Get regular eye exams. · Build a support system of family, friends, and health professionals. When should you call for help?   Call 911 anytime you think you may need emergency care. For example, call if:    · You have symptoms of a heart attack. These may include:  ? Chest pain or pressure, or a strange feeling in the chest.  ? Sweating. ? Shortness of breath. ? Nausea or vomiting. ? Pain, pressure, or a strange feeling in the back, neck, jaw, or upper belly or in one or both shoulders or arms. ? Lightheadedness or sudden weakness. ? A fast or irregular heartbeat.    After you call 911, the  may tell you to chew 1 adult-strength or 2 to 4 low-dose aspirin. Wait for an ambulance. Do not try to drive yourself.   Call your doctor now or seek immediate medical care if:    · You are short of breath.     · You have blood in your urine or are urinating less often and in smaller amounts than usual.     · You have a fever.     · You feel depressed or notice any changes in your behavior or thinking.     · You are dizzy or have muscle weakness.     · You have swelling of the lower legs or feet.    Watch closely for changes in your health, and be sure to contact your doctor if:    · Your symptoms get worse or you develop any new symptoms. These may include aching or swollen joints, increased fatigue, loss of appetite, hair loss, skin rashes, or new sores in your mouth or nose. Where can you learn more? Go to http://ryder-mynor.info/. Enter C418 in the search box to learn more about \"Lupus: Care Instructions. \"  Current as of: April 1, 2019  Content Version: 12.2  © 7834-8480 [employer]. Care instructions adapted under license by [employer] (which disclaims liability or warranty for this information). If you have questions about a medical condition or this instruction, always ask your healthcare professional. [place] [address] any warranty or liability for your use of this information.          Juvenile Idiopathic Arthritis: Care Instructions  Your Care Instructions  Juvenile idiopathic arthritis is a disease that happens to children. It causes swollen and stiff joints. Experts think it happens when a child's natural defenses (immune system) attack his or her joints. Your child may have some pain and walk with a limp. And your child may have red or swollen eyes. Sometimes this disease is called juvenile rheumatoid arthritis. Your child can take pills or get a shot in a joint to reduce pain and swelling. Physical therapy can help keep your child's joints flexible. Follow-up care is a key part of your child's treatment and safety. Be sure to make and go to all appointments, and call your doctor if your child is having problems. It's also a good idea to know your child's test results and keep a list of the medicines your child takes. How can you care for your child at home? · Help your child get the right mix of exercise and rest. Exercise helps keep joints strong and flexible. But your child may also need extra rest during the day. · See a physical therapist. He or she can help your child move stiff joints. This is most important for younger children who can't do exercises on their own. · Encourage your child to do his or her usual activities as much as possible. Your child may be able to do low-impact sports. Good examples are swimming, biking, and rowing. These sports are good for the heart and lungs. And they build strength and keep joints flexible. They may also reduce pain and the need for medicine. · Give your child anti-inflammatory medicines to reduce pain and swelling, if your doctor recommends them. These include ibuprofen (Advil and Motrin). Read and follow all instructions on the label. · If your doctor prescribes medicine, have your child take it exactly as prescribed. Your child may get medicine to control the immune system. Or he or she may get medicine to reduce pain and swelling. Do not stop or change a medicine without talking to your doctor.   · Make sure your child has regular eye exams.  When should you call for help? Call your doctor now or seek immediate medical care if:    · Your child has new symptoms. These include eye pain, blurred vision, loss of vision, or red eyes.     · Your child's joint pain or swelling seems worse.    Watch closely for changes in your child's health, and be sure to contact your doctor if:    · Your child has more trouble walking than usual.   Where can you learn more? Go to http://ryder-mynor.info/. Enter X483 in the search box to learn more about \"Juvenile Idiopathic Arthritis: Care Instructions. \"  Current as of: April 1, 2019  Content Version: 12.2  © 7904-2368 [employer]. Care instructions adapted under license by [employer] (which disclaims liability or warranty for this information). If you have questions about a medical condition or this instruction, always ask your healthcare professional. [place] [address] any warranty or liability for your use of this information. Rheumatoid Arthritis: Care Instructions  Your Care Instructions    Arthritis is a common health problem in which the joints are inflamed. There are many types of arthritis. In rheumatoid arthritis, the body's own immune system attacks the joints. This causes pain, stiffness, and swelling in the joints, especially in the hands and feet. It can become hard to open jars, write, and do other daily tasks. Sometimes rheumatoid arthritis can also cause bumps to form under the skin. Over time, rheumatoid arthritis can damage and deform joints. Early treatment with medicines may reduce your chances of having a lasting disability. Follow-up care is a key part of your treatment and safety. Be sure to make and go to all appointments, and call your doctor if you are having problems. It's also a good idea to know your test results and keep a list of the medicines you take. How can you care for yourself at home?   · If your doctor recommends it, get more exercise. Walking is a good choice. If your knees or ankles hurt, try riding a stationary bike or swimming. · Move each joint gently through its full range of motion once or twice a day. · Rest joints when they are sore or overworked. Short rest breaks may help more than staying in bed. · Reach and stay at a healthy weight. Regular exercise and a healthy diet will help you do this. Extra weight can strain the joints, especially the knees and hips, and make the pain worse. Losing even a few pounds may help. · Get enough calcium and vitamin D to help prevent osteoporosis, which causes thin bones. Talk to your doctor about how much you should take. · Protect your joints from injury. Do not overuse them. Try to limit or avoid activities that cause joint pain or swelling. Use special kitchen tools and other self-help devices as well as walkers, splints, or canes if needed. · Use heat to ease pain. Take warm showers or baths. Use hot packs or a heating pad set on low. Sleep under a warm electric blanket. · Put ice or a cold pack on the area for 10 to 20 minutes at a time. Put a thin cloth between the ice and your skin. · Take pain medicines exactly as directed. ? If the doctor gave you a prescription medicine for pain, take it as prescribed. ? If you are not taking a prescription pain medicine, ask your doctor if you can take an over-the-counter medicine. · Take an active role in managing your condition. Set up a treatment plan with your doctor, and learn as much as you can about rheumatoid arthritis. This will help you control pain and stay active. When should you call for help?   Call your doctor now or seek immediate medical care if:    · You have a fever or a rash along with joint pain.     · You have joint pain that is so severe that you cannot use the joint at all.     · You have sudden swelling, redness, or pain in one or more joints, and you do not know why.     · You have back or neck pain along with weakness in your arms or legs.     · You have a loss of bowel or bladder control.    Watch closely for changes in your health, and be sure to contact your doctor if:    · You have joint pain that lasts for more than 6 weeks.     · You have side effects from your arthritis medicines, such as stomach pain, nausea, heartburn, or dark and tarlike stools. Where can you learn more? Go to http://ryder-mynor.info/. Enter K205 in the search box to learn more about \"Rheumatoid Arthritis: Care Instructions. \"  Current as of: April 1, 2019  Content Version: 12.2  © 1905-7298 [employer]. Care instructions adapted under license by [employer] (which disclaims liability or warranty for this information). If you have questions about a medical condition or this instruction, always ask your healthcare professional. [address] any warranty or liability for your use of this information.

## 2019-11-18 NOTE — PROGRESS NOTES
Nikki Zabala is a 48 y.o. female (: 1965) presenting to address:    Chief Complaint   Patient presents with    Back Pain     feels flutters in center of back, dull constant pain since 2019; fingers feel stiff, achy       Vitals:    19 1447   BP: 122/84   Pulse: (!) 101   Resp: 14   Temp: 98.1 °F (36.7 °C)   TempSrc: Oral   SpO2: 100%   Weight: 118 lb 9.6 oz (53.8 kg)   Height: 5' 1\" (1.549 m)   PainSc:   3   PainLoc: Back   LMP: 12/15/2018       Hearing/Vision:   No exam data present    Learning Assessment:     Learning Assessment 2019   PRIMARY LEARNER Patient   HIGHEST LEVEL OF EDUCATION - PRIMARY LEARNER  4 YEARS OF COLLEGE   BARRIERS PRIMARY LEARNER NONE   CO-LEARNER CAREGIVER No   PRIMARY LANGUAGE ENGLISH   LEARNER PREFERENCE PRIMARY LISTENING     DEMONSTRATION     READING   ANSWERED BY patient   RELATIONSHIP SELF     Depression Screening:     3 most recent PHQ Screens 2019   Little interest or pleasure in doing things Not at all   Feeling down, depressed, irritable, or hopeless Not at all   Total Score PHQ 2 0     Fall Risk Assessment:     Fall Risk Assessment, last 12 mths 2019   Able to walk? Yes   Fall in past 12 months? No     Abuse Screening:     Abuse Screening Questionnaire 2019   Do you ever feel afraid of your partner? N   Are you in a relationship with someone who physically or mentally threatens you? N   Is it safe for you to go home? Y     Coordination of Care Questionaire:   1. Have you been to the ER, urgent care clinic since your last visit? Hospitalized since your last visit? NO    2. Have you seen or consulted any other health care providers outside of the [address] [place] [address] since your last visit? Include any pap smears or colon screening. NO    Advanced Directive:   1. Do you have an Advanced Directive? YES    2. Would you like information on Advanced Directives? NO    Patient DECLINED flu vaccine.

## 2019-12-10 ENCOUNTER — TELEPHONE (OUTPATIENT)
Dept: FAMILY MEDICINE CLINIC | Age: 54
End: 2019-12-10

## 2019-12-10 NOTE — TELEPHONE ENCOUNTER
Pt is wanting to switch PCP from Dr Ethan Gill to Dr Deandre Arnold the reason being is that she wants someone that she can feel more comfortable around and her daughter and a close friend of hers both see Dr Deandre Arnold and highly recommend her. She is trying to be seen for upper back pain.  Please advise

## 2020-02-05 ENCOUNTER — TELEPHONE (OUTPATIENT)
Dept: FAMILY MEDICINE CLINIC | Age: 55
End: 2020-02-05

## 2020-02-05 NOTE — TELEPHONE ENCOUNTER
Pt called asking for a same day appointment for an ER follow up. Pt stated that she was seem at the ER for Anxiety related issues. Currently next available 30 min follow up is on 2/19/2020. Please advise.

## 2021-02-15 NOTE — PROGRESS NOTES
HISTORY OF PRESENT ILLNESS  Deedee [place] is a 50 y.o. female. Ms. Sarthak Silver reports that she has not been back for follow-up because she lost her health insurance. She notes that she has had an extremely stressful year having lost her job, lost her home and  from her . She indicates that she now has been able to obtain Medicaid coverage and has found employment which will begin soon. She notes that she was assigned to another PCP by Medicaid and has been there once to be seen about some apparent left musculoskeletal chest discomfort. She notes that she is here today because she was denied life insurance coverage based on a work-up that was \"not completed\" here in 2019. Review of the chart indicates that she was being evaluated for musculoskeletal back pain and also pain in her hands and had reported a history of juvenile rheumatoid arthritis. The plan was for her to return for lab studies to further evaluate the possibility of an underlying autoimmune or connective tissue process associated with her symptoms but she was unable to do so because of the loss of health insurance coverage. Today she notes that her new PCP has obtained lab studies which she describes as and \"arthritis work-up\". Very likely the studies that were contemplated back in 2019 have been completed.     Mr#: [id number]      Past Medical History:   Diagnosis Date    GERD (gastroesophageal reflux disease)        Past Surgical History:   Procedure Laterality Date    HX  SECTION      HX COLONOSCOPY N/A 2017    normal    HX HYSTERECTOMY  01/15/2019    also salpingo/oophorectomy    HX HYSTEROSCOPY WITH ENDOMETRIAL ABLATION         Family History   Problem Relation Age of Onset    Hypertension Mother     Diabetes Mother     Hearing Impairment Mother     Kidney Disease Mother     Cancer Father         bladder cancer     Diabetes Father     Diabetes Maternal Grandmother     Hypertension Maternal Grandmother     Heart Attack Maternal Grandmother     Cancer Maternal Grandfather         unknown    Heart Disease Neg Hx        Allergies   Allergen Reactions    Aspirin Other (comments)     Pt reports bruising       Social History     Tobacco Use   Smoking Status Never Smoker   Smokeless Tobacco Never Used       Social History     Substance and Sexual Activity   Alcohol Use Yes    Comment: social drinker          Patient Active Problem List   Diagnosis Code    Vitamin D insufficiency E55.9    Trapezius muscle spasm M62.838    Gastroesophageal reflux disease K21.9    Prediabetes R73.03    Fibroids D21.9    Elevated LFTs R79.89    Calculus of gallbladder without cholecystitis without obstruction K80.20         Current Outpatient Medications:     latanoprost (XALATAN) 0.005 % ophthalmic solution, latanoprost 0.005 % eye drops  INSTILL 1 DROP IN BOTH EYES AT BEDTIME, Disp: , Rfl:     multivitamin (ONE A DAY) tablet, Take 1 Tab by mouth daily. Organic Life multivitamin, Disp: , Rfl:     biotin 10,000 mcg cap, Take  by mouth., Disp: , Rfl:        Review of Systems     Visit Vitals  /78 (BP 1 Location: Left upper arm, BP Patient Position: Sitting, BP Cuff Size: Adult)   Pulse 84   Temp 98.2 °F (36.8 °C) (Temporal)   Resp 13   Ht 5' 1\" (1.549 m)   Wt 120 lb 3.2 oz (54.5 kg)   LMP 12/15/2018 (Approximate)   SpO2 99%   BMI 22.71 kg/m²       Physical Exam  Vitals signs and nursing note reviewed. Constitutional:       General: She is not in acute distress. Appearance: Normal appearance. She is normal weight. She is not ill-appearing. HENT:      Head: Normocephalic. Eyes:      Extraocular Movements: Extraocular movements intact. Cardiovascular:      Rate and Rhythm: Normal rate. Pulmonary:      Effort: Pulmonary effort is normal.   Neurological:      Mental Status: She is alert and oriented to person, place, and time.    Psychiatric:         Mood and Affect: Mood normal. Behavior: Behavior normal.         ASSESSMENT and PLAN    ICD-10-CM ICD-9-CM    1. Routine general medical examination at a health care facility  Z00.00 V70.0    2. History of prediabetes  Z87.898 V12.29    3. Vitamin D insufficiency  E55.9 268.9    4. History of iron deficiency anemia  Z86.2 V12.3    5. Encounter for lipid screening for cardiovascular disease  Z13.220 V77.91     Z13.6 V81.2      A letter was provided explaining the plans associated with the encounter in November 2019. She was advised that hopefully she can obtain the lab results from her new PCP and that these will satisfy the potential life insurance carrier that the work-up has been completed. She indicates that she would like to transfer her care back here and is advised that she would be welcome to return but should contact Medicaid about how to go about changing her primary care provider. Ophelia Castano MD      PLEASE NOTE:   This document has been produced using voice recognition software. Unrecognized errors in transcription may be present.

## 2021-02-16 ENCOUNTER — OFFICE VISIT (OUTPATIENT)
Dept: FAMILY MEDICINE CLINIC | Age: 56
End: 2021-02-16

## 2021-02-16 VITALS
HEIGHT: 61 IN | DIASTOLIC BLOOD PRESSURE: 78 MMHG | SYSTOLIC BLOOD PRESSURE: 124 MMHG | OXYGEN SATURATION: 99 % | BODY MASS INDEX: 22.69 KG/M2 | HEART RATE: 84 BPM | WEIGHT: 120.2 LBS | TEMPERATURE: 98.2 F | RESPIRATION RATE: 13 BRPM

## 2021-02-16 DIAGNOSIS — Z59.89 INSURANCE COVERAGE PROBLEMS: Primary | ICD-10-CM

## 2021-02-16 SDOH — ECONOMIC STABILITY - INCOME SECURITY: OTHER PROBLEMS RELATED TO HOUSING AND ECONOMIC CIRCUMSTANCES: Z59.89

## 2021-02-16 NOTE — PROGRESS NOTES
Micheal Oseguera is a 50 y.o. female (: 1965) presenting to address:    Chief Complaint   Patient presents with    Follow-up       Vitals:    21 1054   BP: 124/78   Pulse: 84   Resp: 13   Temp: 98.2 °F (36.8 °C)   TempSrc: Temporal   SpO2: 99%   Weight: 120 lb 3.2 oz (54.5 kg)   Height: 5' 1\" (1.549 m)   PainSc:   0 - No pain   LMP: 12/15/2018       Hearing/Vision:   No exam data present    Learning Assessment:     Learning Assessment 2019   PRIMARY LEARNER Patient   HIGHEST LEVEL OF EDUCATION - PRIMARY LEARNER  4 YEARS OF COLLEGE   BARRIERS PRIMARY LEARNER NONE   CO-LEARNER CAREGIVER No   PRIMARY LANGUAGE ENGLISH   LEARNER PREFERENCE PRIMARY LISTENING     DEMONSTRATION     READING   ANSWERED BY patient   RELATIONSHIP SELF     Depression Screening:     3 most recent PHQ Screens 2021   [facility] 36 Not Done Patient Decline   Little interest or pleasure in doing things Not at all   Feeling down, depressed, irritable, or hopeless Not at all   Total Score PHQ 2 0     Fall Risk Assessment:     Fall Risk Assessment, last 12 mths 2021   Able to walk? Yes   Fall in past 12 months? 0   Do you feel unsteady? 0   Are you worried about falling 0     Abuse Screening:     Abuse Screening Questionnaire 2019   Do you ever feel afraid of your partner? N   Are you in a relationship with someone who physically or mentally threatens you? N   Is it safe for you to go home? Y     Coordination of Care Questionaire:   1. Have you been to the ER, urgent care clinic since your last visit? Hospitalized since your last visit? NO    2. Have you seen or consulted any other health care providers outside of the [address] [place] [address] since your last visit? Include any pap smears or colon screening. NO    Advanced Directive:   1. Do you have an Advanced Directive? YES    2. Would you like information on Advanced Directives? NO    Patient DECLINED flu vaccine.

## 2021-02-16 NOTE — LETTER
2/16/2021 11:35 AM 
 
Ms. Deedee Armas 
[address] [facility] [id number] 
 
 
To Whom It May Concern: 
 
Deedee Armas was under the care of [facility] at the time of her encounter here on 11/18/2019.. 
 
At the time of the encounter she reported symptoms of musculoskeletal pain involving her hands and back and also a history of juvenile rheumatoid arthritis.  It was planned for her to obtain laboratory studies for further work-up of possible underlying causes of arthritis however she was unable to return for those studies due to loss of health insurance.  The studies would have included an antinuclear antibody and rheumatoid factor study. 
She is currently under the care of a different primary care provider and reports that \"an arthritis work-up\" has already been done.  These results should be available from her new provider. 
 
 
Sincerely, 
 
 
Joseluis Dial MD

## 2021-02-16 NOTE — LETTER
2/16/2021 11:31 AM 
 
Ms. Jeff Person [address] [facility] [address] [facility] [phone] To Whom It May Concern: 
 
Jeff Person under the care of [address] [place] [address]. At the time of her encounter here on 11/18/2019. At the time of the encounter she reported symptoms of musculoskeletal pain involving her hands and back and a history of juvenile rheumatoid arthritis. It was planned for her to obtain laboratory studies for further work-up of possible underlying causes of arthritis however she was unable to return for those studies due to loss of health insurance. The studies would have included an antinuclear antibody and rheumatoid factor. She is currently under the care of a different primary care provider and reports that \"an arthritis work-up\" has already been done. These results should be available from her new provider. Sincerely, Vandana Nicholson MD

## 2022-03-18 PROBLEM — K21.9 GASTROESOPHAGEAL REFLUX DISEASE: Status: ACTIVE | Noted: 2018-03-20

## 2022-03-18 PROBLEM — K80.20 CALCULUS OF GALLBLADDER WITHOUT CHOLECYSTITIS WITHOUT OBSTRUCTION: Status: ACTIVE | Noted: 2019-03-26

## 2022-03-19 PROBLEM — M62.838 TRAPEZIUS MUSCLE SPASM: Status: ACTIVE | Noted: 2018-03-20

## 2022-03-19 PROBLEM — E55.9 VITAMIN D INSUFFICIENCY: Status: ACTIVE | Noted: 2017-11-28

## 2022-03-19 PROBLEM — R79.89 ELEVATED LFTS: Status: ACTIVE | Noted: 2019-03-26

## 2022-03-19 PROBLEM — D21.9 FIBROIDS: Status: ACTIVE | Noted: 2019-01-15

## 2022-03-19 PROBLEM — R73.03 PREDIABETES: Status: ACTIVE | Noted: 2018-03-21

## 2023-12-02 LAB — HBA1C MFR BLD HPLC: 6.2 %

## 2024-04-22 ENCOUNTER — OFFICE VISIT (OUTPATIENT)
Dept: FAMILY MEDICINE CLINIC | Facility: CLINIC | Age: 59
End: 2024-04-22
Payer: COMMERCIAL

## 2024-04-22 VITALS
BODY MASS INDEX: 29.57 KG/M2 | TEMPERATURE: 97.7 F | RESPIRATION RATE: 18 BRPM | DIASTOLIC BLOOD PRESSURE: 83 MMHG | WEIGHT: 156.6 LBS | HEIGHT: 61 IN | HEART RATE: 91 BPM | OXYGEN SATURATION: 100 % | SYSTOLIC BLOOD PRESSURE: 124 MMHG

## 2024-04-22 DIAGNOSIS — Z76.89 ESTABLISHING CARE WITH NEW DOCTOR, ENCOUNTER FOR: ICD-10-CM

## 2024-04-22 DIAGNOSIS — I10 PRIMARY HYPERTENSION: Primary | ICD-10-CM

## 2024-04-22 PROCEDURE — 99214 OFFICE O/P EST MOD 30 MIN: CPT | Performed by: STUDENT IN AN ORGANIZED HEALTH CARE EDUCATION/TRAINING PROGRAM

## 2024-04-22 PROCEDURE — 3080F DIAST BP >= 90 MM HG: CPT | Performed by: STUDENT IN AN ORGANIZED HEALTH CARE EDUCATION/TRAINING PROGRAM

## 2024-04-22 PROCEDURE — 3077F SYST BP >= 140 MM HG: CPT | Performed by: STUDENT IN AN ORGANIZED HEALTH CARE EDUCATION/TRAINING PROGRAM

## 2024-04-22 RX ORDER — TIMOLOL MALEATE 5 MG/ML
1 SOLUTION OPHTHALMIC DAILY
COMMUNITY
Start: 2024-01-06

## 2024-04-22 RX ORDER — AMLODIPINE BESYLATE 5 MG/1
5 TABLET ORAL DAILY
COMMUNITY
Start: 2024-03-12

## 2024-04-22 SDOH — ECONOMIC STABILITY: FOOD INSECURITY: WITHIN THE PAST 12 MONTHS, YOU WORRIED THAT YOUR FOOD WOULD RUN OUT BEFORE YOU GOT MONEY TO BUY MORE.: NEVER TRUE

## 2024-04-22 SDOH — ECONOMIC STABILITY: INCOME INSECURITY: HOW HARD IS IT FOR YOU TO PAY FOR THE VERY BASICS LIKE FOOD, HOUSING, MEDICAL CARE, AND HEATING?: NOT HARD AT ALL

## 2024-04-22 SDOH — ECONOMIC STABILITY: FOOD INSECURITY: WITHIN THE PAST 12 MONTHS, THE FOOD YOU BOUGHT JUST DIDN'T LAST AND YOU DIDN'T HAVE MONEY TO GET MORE.: NEVER TRUE

## 2024-04-22 SDOH — ECONOMIC STABILITY: HOUSING INSECURITY
IN THE LAST 12 MONTHS, WAS THERE A TIME WHEN YOU DID NOT HAVE A STEADY PLACE TO SLEEP OR SLEPT IN A SHELTER (INCLUDING NOW)?: NO

## 2024-04-22 ASSESSMENT — ENCOUNTER SYMPTOMS
SHORTNESS OF BREATH: 0
SINUS PAIN: 0
CHEST TIGHTNESS: 0

## 2024-04-22 ASSESSMENT — PATIENT HEALTH QUESTIONNAIRE - PHQ9
1. LITTLE INTEREST OR PLEASURE IN DOING THINGS: NOT AT ALL
SUM OF ALL RESPONSES TO PHQ QUESTIONS 1-9: 0
2. FEELING DOWN, DEPRESSED OR HOPELESS: NOT AT ALL
SUM OF ALL RESPONSES TO PHQ QUESTIONS 1-9: 0
SUM OF ALL RESPONSES TO PHQ QUESTIONS 1-9: 0
SUM OF ALL RESPONSES TO PHQ9 QUESTIONS 1 & 2: 0
SUM OF ALL RESPONSES TO PHQ QUESTIONS 1-9: 0

## 2024-04-22 NOTE — PROGRESS NOTES
Chichi Bashir is a 59 y.o. female (: 1965) presenting to address:    Chief Complaint   Patient presents with    New Patient       Vitals:    24 1506   BP: (!) 148/102   Pulse: 91   Resp: 18   Temp: 97.7 °F (36.5 °C)   SpO2: 100%       \"Have you been to the ER, urgent care clinic since your last visit?  Hospitalized since your last visit?\"    YES - When: approximately 1 months ago.  Where and Why: urgent care sent to ER for elevated /102 .    “Have you seen or consulted any other health care providers outside of [facility] since your last visit?”    NO       Have you had a mammogram?”   YES - Where: 2023   Date of last Mammogram: 5/15/2021

## 2024-04-22 NOTE — PROGRESS NOTES
[facility]    HISTORY OF PRESENT ILLNESS  Chichi Bashir  is a 59 y.o. y.o. female here to establish care.    HTN  -amlodipine 5  -recently diagnosed  -home bp 120-140s/90    Back pain  -going to PT  -following neurology    Difficulty swallowing  -following GI  -recently had an EGD  -possibly due to sleep apnea    HA   -will wake up from sleeping with headache     Health Maintenance:    Cervical Cancer Screen/PAP: hysterectomy   Breast Cancer Screen/Mammogram:  HCV Screen:   Lab Results   Component Value Date    HEPCAB 0.02 2019    HEPCAB NEGATIVE 2019                  DEXA:   No results found for this or any previous visit from the past 3650 days.     Colon Cancer Screening:     Smoking Status:   Tobacco Use      Smoking status: Never      Smokeless tobacco: Never     Lung Cancer Screening:  CT Low Dose n/a         Immunizations:  Flu vaccine- Recommended every fall  COVID vaccine primary series- complete  Tetanus- Tdap   Shingrix- series not completed  RSV-not recommended  Pneumovax 23-  N/A  Prevnar 20- at age 67    Social: works for [employer], daughter is my pt    Mr#: [id number]      Past Medical History:   Diagnosis Date    GERD (gastroesophageal reflux disease)        Past Surgical History:   Procedure Laterality Date     SECTION      COLONOSCOPY N/A 2017    normal    ENDOMETRIAL ABLATION      HYSTERECTOMY (CERVIX STATUS UNKNOWN)  01/15/2019    also salpingo/oophorectomy       Family History   Problem Relation Age of Onset    Heart Disease Neg Hx     Cancer Maternal Grandfather         unknown    Heart Attack Maternal Grandmother     Hypertension Maternal Grandmother     Diabetes Father     Cancer Father         bladder cancer     Kidney Disease Mother     Hearing Impairment Mother     Diabetes Mother     Hypertension Mother     Diabetes Maternal Grandmother        Allergies   Allergen Reactions    Aspirin Other (See Comments)     Pt reports bruising

## 2024-06-21 ENCOUNTER — OFFICE VISIT (OUTPATIENT)
Dept: FAMILY MEDICINE CLINIC | Facility: CLINIC | Age: 59
End: 2024-06-21
Payer: COMMERCIAL

## 2024-06-21 VITALS
TEMPERATURE: 97.1 F | HEART RATE: 83 BPM | BODY MASS INDEX: 29.27 KG/M2 | WEIGHT: 155 LBS | DIASTOLIC BLOOD PRESSURE: 84 MMHG | OXYGEN SATURATION: 99 % | RESPIRATION RATE: 16 BRPM | HEIGHT: 61 IN | SYSTOLIC BLOOD PRESSURE: 123 MMHG

## 2024-06-21 DIAGNOSIS — I10 PRIMARY HYPERTENSION: ICD-10-CM

## 2024-06-21 DIAGNOSIS — R73.03 PREDIABETES: Primary | ICD-10-CM

## 2024-06-21 DIAGNOSIS — R79.89 ELEVATED LFTS: ICD-10-CM

## 2024-06-21 PROBLEM — D21.9 FIBROIDS: Status: RESOLVED | Noted: 2019-01-15 | Resolved: 2024-06-21

## 2024-06-21 PROBLEM — M62.838 TRAPEZIUS MUSCLE SPASM: Status: RESOLVED | Noted: 2018-03-20 | Resolved: 2024-06-21

## 2024-06-21 PROCEDURE — 99214 OFFICE O/P EST MOD 30 MIN: CPT | Performed by: STUDENT IN AN ORGANIZED HEALTH CARE EDUCATION/TRAINING PROGRAM

## 2024-06-21 PROCEDURE — 3074F SYST BP LT 130 MM HG: CPT | Performed by: STUDENT IN AN ORGANIZED HEALTH CARE EDUCATION/TRAINING PROGRAM

## 2024-06-21 PROCEDURE — 3079F DIAST BP 80-89 MM HG: CPT | Performed by: STUDENT IN AN ORGANIZED HEALTH CARE EDUCATION/TRAINING PROGRAM

## 2024-06-21 RX ORDER — OMEPRAZOLE 40 MG/1
40 CAPSULE, DELAYED RELEASE ORAL DAILY
COMMUNITY
Start: 2024-05-30

## 2024-06-21 ASSESSMENT — ENCOUNTER SYMPTOMS
SHORTNESS OF BREATH: 0
SINUS PAIN: 0
CHEST TIGHTNESS: 0

## 2024-06-21 NOTE — PROGRESS NOTES
[facility]    HISTORY OF PRESENT ILLNESS  Chichi Bashir  is a 59 y.o. y.o. female here for follow up.     HYPERTENSION, Essential  Reports Compliance with meds amlodipine 5    Denies Chest pain, shortness of breath, lower extremity edema, vision changes, change in urination.    No results found for: \"MALBCR\", \"NA\", \"K\", \"CL\", \"CO2\", \"GLUCOSE\", \"BUN\", \"CREATININE\", \"CALCIUM\", \"ALT\", \"AST\", \"ALKPHOS\", \"LABALBU\"    preDM  -hgb A1c  6.2    Elevated LFTs  -following GI  -going to do more testing July    Burning lips  -slightly ore dry  -tingling   -no swelling, no rash      Social: works for [employer], daughter is my pt     Mr#: [id number]      Past Medical History:   Diagnosis Date    GERD (gastroesophageal reflux disease)        Past Surgical History:   Procedure Laterality Date     SECTION      COLONOSCOPY N/A 2017    normal    ENDOMETRIAL ABLATION      HYSTERECTOMY (CERVIX STATUS UNKNOWN)  01/15/2019    also salpingo/oophorectomy       Family History   Problem Relation Age of Onset    Heart Disease Neg Hx     Cancer Maternal Grandfather         unknown    Heart Attack Maternal Grandmother     Hypertension Maternal Grandmother     Diabetes Father     Cancer Father         bladder cancer     Kidney Disease Mother     Hearing Impairment Mother     Diabetes Mother     Hypertension Mother     Diabetes Maternal Grandmother        Allergies   Allergen Reactions    Aspirin Other (See Comments)     Pt reports bruising       Social History     Tobacco Use   Smoking Status Never   Smokeless Tobacco Never       Social History     Substance and Sexual Activity   Alcohol Use Yes       Immunization History   Administered Date(s) Administered    TD 5LF, TENIVAC, (age 7y+), IM, 0.5mL 2017    TDaP, ADACEL (age 10y-64y), BOOSTRIX (age 10y+), IM, 0.5mL 2017, 2017       Patient Active Problem List   Diagnosis    Calculus of gallbladder without cholecystitis without

## 2024-06-21 NOTE — PROGRESS NOTES
Chichi Bashir is a 59 y.o. female (: 1965) presenting to address:    Chief Complaint   Patient presents with    Hypertension       Vitals:    24 1447   BP: 123/84   Pulse: 83   Resp: 16   Temp: 97.1 °F (36.2 °C)   SpO2: 99%       \"Have you been to the ER, urgent care clinic since your last visit?  Hospitalized since your last visit?\"    NO    “Have you seen or consulted any other health care providers outside of [facility] since your last visit?”    Yes, GI

## 2024-07-05 RX ORDER — AMLODIPINE BESYLATE 5 MG/1
5 TABLET ORAL DAILY
Qty: 90 TABLET | Refills: 3 | Status: SHIPPED | OUTPATIENT
Start: 2024-07-05

## 2024-07-05 NOTE — TELEPHONE ENCOUNTER
Pt for medication request, but did not know what medication it was. Pt would like a call back on her cell.

## 2024-07-08 ENCOUNTER — TELEPHONE (OUTPATIENT)
Dept: FAMILY MEDICINE CLINIC | Facility: CLINIC | Age: 59
End: 2024-07-08

## 2024-07-08 NOTE — TELEPHONE ENCOUNTER
Pt would like a gallbladder referral. And would like a call from clinical staff in regards to Gallbladder imaging. Please advise.

## 2024-07-09 NOTE — TELEPHONE ENCOUNTER
Patient forgot at the time she saw GI but found all her information she needed and called them with the questions she had. No further actions needed.

## 2024-12-12 ENCOUNTER — TELEPHONE (OUTPATIENT)
Dept: FAMILY MEDICINE CLINIC | Facility: CLINIC | Age: 59
End: 2024-12-12

## 2025-02-05 NOTE — LETTER
NOTIFICATION RETURN TO WORK / SCHOOL 
 
2/26/2019 3:19 PM 
 
Ms. Jeff Person [address] [place] [id number] To Whom It May Concern: 
 
Jeff Person is currently under the care of [address] [place] [address]. She will return to work on: 03/04/19. If there are questions or concerns please have the patient contact our office. Sincerely, Tita Isabel MD 
 
                                
 
 36.9

## 2025-09-03 RX ORDER — AMLODIPINE BESYLATE 5 MG/1
5 TABLET ORAL DAILY
Qty: 90 TABLET | Refills: 3 | OUTPATIENT
Start: 2025-09-03

## (undated) DEVICE — DBD-DRAPE,LAPAROTOMY,PFANN,STERILE: Brand: MEDLINE

## (undated) DEVICE — SUTURE VCRL SZ 0 L27IN ABSRB UD L36MM CT-1 1/2 CIR J260H

## (undated) DEVICE — TRAY CATH OD16FR SIL URIN M STATLOK STBL DEV SURSTP

## (undated) DEVICE — SUTURE MCRYL SZ 4-0 L27IN ABSRB UD L24MM PS-1 3/8 CIR PRIM Y935H

## (undated) DEVICE — WATERPROOF, BACTERIA PROOF DRESSING WITH ABSORBENT SEE THROUGH PAD: Brand: OPSITE POST-OP VISIBLE 25X10CM CTN 20

## (undated) DEVICE — KIT CLN UP BON SECOURS MARYV

## (undated) DEVICE — SUTURE PLN GUT SZ 3-0 L27IN ABSRB YELLOWISH TAN L36MM CT-1 842H

## (undated) DEVICE — STERILE POLYISOPRENE POWDER-FREE SURGICAL GLOVES: Brand: PROTEXIS

## (undated) DEVICE — (D)PREP SKN CHLRAPRP APPL 26ML -- CONVERT TO ITEM 371833

## (undated) DEVICE — (D)STRIP SKN CLSR 0.5X4IN WHT --

## (undated) DEVICE — PACK PROCEDURE SURG MAJ W/ BASIN LF

## (undated) DEVICE — BLADE ELECTRODE: Brand: EDGE

## (undated) DEVICE — SUTURE VCRL SZ 2-0 L12X18IN ABSRB UD POLYGLACTIN 910 BRAID J911T

## (undated) DEVICE — SUT CHRMC 3-0 36IN V34 BRN --

## (undated) DEVICE — 3M™ BAIR PAWS FLEX™ WARMING GOWN, STANDARD, 20 PER CASE 81003: Brand: BAIR PAWS™

## (undated) DEVICE — SUTURE VCRL SZ 0 L18IN ABSRB UD POLYGLACTIN 910 BRAID TIE J912G

## (undated) DEVICE — SPONGE LAP 18X18IN STRL -- 5/PK

## (undated) DEVICE — SUTURE CHROMIC GUT SZ 2-0 L27IN ABSRB BRN L26MM SH 1/2 CIR G123H

## (undated) DEVICE — SOLUTION IV 1000ML 0.9% SOD CHL

## (undated) DEVICE — DRAPE,REIN 53X77,STERILE: Brand: MEDLINE

## (undated) DEVICE — SUTURE VCRL SZ 2-0 L27IN ABSRB VLT L36MM CT-1 1/2 CIR J339H

## (undated) DEVICE — 3M™ STERI-STRIP™ COMPOUND BENZOIN TINCTURE 40 BAGS/CARTON 4 CARTONS/CASE C1544: Brand: 3M™ STERI-STRIP™

## (undated) DEVICE — INTENDED FOR TISSUE SEPARATION, AND OTHER PROCEDURES THAT REQUIRE A SHARP SURGICAL BLADE TO PUNCTURE OR CUT.: Brand: BARD-PARKER SAFETY BLADES SIZE 10, STERILE

## (undated) DEVICE — FLEX ADVANTAGE 3000CC: Brand: FLEX ADVANTAGE

## (undated) DEVICE — REM POLYHESIVE ADULT PATIENT RETURN ELECTRODE: Brand: VALLEYLAB